# Patient Record
Sex: FEMALE | Race: BLACK OR AFRICAN AMERICAN | NOT HISPANIC OR LATINO | URBAN - METROPOLITAN AREA
[De-identification: names, ages, dates, MRNs, and addresses within clinical notes are randomized per-mention and may not be internally consistent; named-entity substitution may affect disease eponyms.]

---

## 2021-04-03 ENCOUNTER — EMERGENCY (EMERGENCY)
Facility: HOSPITAL | Age: 26
LOS: 1 days | Discharge: HOME | End: 2021-04-03
Attending: EMERGENCY MEDICINE | Admitting: EMERGENCY MEDICINE
Payer: COMMERCIAL

## 2021-04-03 VITALS
TEMPERATURE: 98 F | HEART RATE: 81 BPM | SYSTOLIC BLOOD PRESSURE: 131 MMHG | RESPIRATION RATE: 16 BRPM | DIASTOLIC BLOOD PRESSURE: 74 MMHG | OXYGEN SATURATION: 100 %

## 2021-04-03 VITALS
HEART RATE: 87 BPM | DIASTOLIC BLOOD PRESSURE: 73 MMHG | RESPIRATION RATE: 16 BRPM | SYSTOLIC BLOOD PRESSURE: 125 MMHG | TEMPERATURE: 97 F | OXYGEN SATURATION: 97 %

## 2021-04-03 LAB
ALBUMIN SERPL ELPH-MCNC: 4.1 G/DL — SIGNIFICANT CHANGE UP (ref 3.3–5)
ALP SERPL-CCNC: 54 U/L — SIGNIFICANT CHANGE UP (ref 40–120)
ALT FLD-CCNC: 9 U/L — SIGNIFICANT CHANGE UP (ref 4–33)
ANION GAP SERPL CALC-SCNC: 13 MMOL/L — SIGNIFICANT CHANGE UP (ref 7–14)
APPEARANCE UR: ABNORMAL
APTT BLD: 28.2 SEC — SIGNIFICANT CHANGE UP (ref 27–36.3)
AST SERPL-CCNC: 23 U/L — SIGNIFICANT CHANGE UP (ref 4–32)
B PERT DNA SPEC QL NAA+PROBE: SIGNIFICANT CHANGE UP
BACTERIA # UR AUTO: ABNORMAL
BASOPHILS # BLD AUTO: 0.04 K/UL — SIGNIFICANT CHANGE UP (ref 0–0.2)
BASOPHILS NFR BLD AUTO: 0.4 % — SIGNIFICANT CHANGE UP (ref 0–2)
BILIRUB SERPL-MCNC: 0.3 MG/DL — SIGNIFICANT CHANGE UP (ref 0.2–1.2)
BILIRUB UR-MCNC: NEGATIVE — SIGNIFICANT CHANGE UP
BLD GP AB SCN SERPL QL: NEGATIVE — SIGNIFICANT CHANGE UP
BLOOD GAS VENOUS COMPREHENSIVE RESULT: SIGNIFICANT CHANGE UP
BUN SERPL-MCNC: 5 MG/DL — LOW (ref 7–23)
C PNEUM DNA SPEC QL NAA+PROBE: SIGNIFICANT CHANGE UP
CALCIUM SERPL-MCNC: 9.4 MG/DL — SIGNIFICANT CHANGE UP (ref 8.4–10.5)
CHLORIDE SERPL-SCNC: 97 MMOL/L — LOW (ref 98–107)
CO2 SERPL-SCNC: 22 MMOL/L — SIGNIFICANT CHANGE UP (ref 22–31)
COLOR SPEC: YELLOW — SIGNIFICANT CHANGE UP
CREAT SERPL-MCNC: 0.62 MG/DL — SIGNIFICANT CHANGE UP (ref 0.5–1.3)
DIFF PNL FLD: NEGATIVE — SIGNIFICANT CHANGE UP
EOSINOPHIL # BLD AUTO: 0.06 K/UL — SIGNIFICANT CHANGE UP (ref 0–0.5)
EOSINOPHIL NFR BLD AUTO: 0.6 % — SIGNIFICANT CHANGE UP (ref 0–6)
EPI CELLS # UR: 9 /HPF — HIGH (ref 0–5)
FLUAV SUBTYP SPEC NAA+PROBE: SIGNIFICANT CHANGE UP
FLUBV RNA SPEC QL NAA+PROBE: SIGNIFICANT CHANGE UP
GLUCOSE SERPL-MCNC: 88 MG/DL — SIGNIFICANT CHANGE UP (ref 70–99)
GLUCOSE UR QL: NEGATIVE — SIGNIFICANT CHANGE UP
HADV DNA SPEC QL NAA+PROBE: SIGNIFICANT CHANGE UP
HCG SERPL-ACNC: SIGNIFICANT CHANGE UP MIU/ML
HCOV 229E RNA SPEC QL NAA+PROBE: SIGNIFICANT CHANGE UP
HCOV HKU1 RNA SPEC QL NAA+PROBE: SIGNIFICANT CHANGE UP
HCOV NL63 RNA SPEC QL NAA+PROBE: SIGNIFICANT CHANGE UP
HCOV OC43 RNA SPEC QL NAA+PROBE: SIGNIFICANT CHANGE UP
HCT VFR BLD CALC: 38.6 % — SIGNIFICANT CHANGE UP (ref 34.5–45)
HGB BLD-MCNC: 12.5 G/DL — SIGNIFICANT CHANGE UP (ref 11.5–15.5)
HMPV RNA SPEC QL NAA+PROBE: SIGNIFICANT CHANGE UP
HPIV1 RNA SPEC QL NAA+PROBE: SIGNIFICANT CHANGE UP
HPIV2 RNA SPEC QL NAA+PROBE: SIGNIFICANT CHANGE UP
HPIV3 RNA SPEC QL NAA+PROBE: SIGNIFICANT CHANGE UP
HPIV4 RNA SPEC QL NAA+PROBE: SIGNIFICANT CHANGE UP
HYALINE CASTS # UR AUTO: 14 /LPF — HIGH (ref 0–7)
IANC: 6.17 K/UL — SIGNIFICANT CHANGE UP (ref 1.5–8.5)
IMM GRANULOCYTES NFR BLD AUTO: 0.4 % — SIGNIFICANT CHANGE UP (ref 0–1.5)
INR BLD: 1.17 RATIO — HIGH (ref 0.88–1.16)
KETONES UR-MCNC: ABNORMAL
LEUKOCYTE ESTERASE UR-ACNC: ABNORMAL
LYMPHOCYTES # BLD AUTO: 2.12 K/UL — SIGNIFICANT CHANGE UP (ref 1–3.3)
LYMPHOCYTES # BLD AUTO: 22.9 % — SIGNIFICANT CHANGE UP (ref 13–44)
MCHC RBC-ENTMCNC: 25.5 PG — LOW (ref 27–34)
MCHC RBC-ENTMCNC: 32.4 GM/DL — SIGNIFICANT CHANGE UP (ref 32–36)
MCV RBC AUTO: 78.8 FL — LOW (ref 80–100)
MONOCYTES # BLD AUTO: 0.82 K/UL — SIGNIFICANT CHANGE UP (ref 0–0.9)
MONOCYTES NFR BLD AUTO: 8.9 % — SIGNIFICANT CHANGE UP (ref 2–14)
NEUTROPHILS # BLD AUTO: 6.17 K/UL — SIGNIFICANT CHANGE UP (ref 1.8–7.4)
NEUTROPHILS NFR BLD AUTO: 66.8 % — SIGNIFICANT CHANGE UP (ref 43–77)
NITRITE UR-MCNC: POSITIVE
NRBC # BLD: 0 /100 WBCS — SIGNIFICANT CHANGE UP
NRBC # FLD: 0 K/UL — SIGNIFICANT CHANGE UP
PH UR: 6.5 — SIGNIFICANT CHANGE UP (ref 5–8)
PLATELET # BLD AUTO: 338 K/UL — SIGNIFICANT CHANGE UP (ref 150–400)
POTASSIUM SERPL-MCNC: 4.6 MMOL/L — SIGNIFICANT CHANGE UP (ref 3.5–5.3)
POTASSIUM SERPL-SCNC: 4.6 MMOL/L — SIGNIFICANT CHANGE UP (ref 3.5–5.3)
PROT SERPL-MCNC: 7.8 G/DL — SIGNIFICANT CHANGE UP (ref 6–8.3)
PROT UR-MCNC: ABNORMAL
PROTHROM AB SERPL-ACNC: 13.4 SEC — SIGNIFICANT CHANGE UP (ref 10.6–13.6)
RAPID RVP RESULT: SIGNIFICANT CHANGE UP
RBC # BLD: 4.9 M/UL — SIGNIFICANT CHANGE UP (ref 3.8–5.2)
RBC # FLD: 16.2 % — HIGH (ref 10.3–14.5)
RBC CASTS # UR COMP ASSIST: 3 /HPF — SIGNIFICANT CHANGE UP (ref 0–4)
RH IG SCN BLD-IMP: POSITIVE — SIGNIFICANT CHANGE UP
RH IG SCN BLD-IMP: POSITIVE — SIGNIFICANT CHANGE UP
RSV RNA SPEC QL NAA+PROBE: SIGNIFICANT CHANGE UP
RV+EV RNA SPEC QL NAA+PROBE: SIGNIFICANT CHANGE UP
SARS-COV-2 RNA SPEC QL NAA+PROBE: SIGNIFICANT CHANGE UP
SODIUM SERPL-SCNC: 132 MMOL/L — LOW (ref 135–145)
SP GR SPEC: 1.02 — SIGNIFICANT CHANGE UP (ref 1.01–1.02)
UROBILINOGEN FLD QL: SIGNIFICANT CHANGE UP
WBC # BLD: 9.25 K/UL — SIGNIFICANT CHANGE UP (ref 3.8–10.5)
WBC # FLD AUTO: 9.25 K/UL — SIGNIFICANT CHANGE UP (ref 3.8–10.5)
WBC UR QL: 54 /HPF — HIGH (ref 0–5)

## 2021-04-03 PROCEDURE — 76830 TRANSVAGINAL US NON-OB: CPT | Mod: 26

## 2021-04-03 PROCEDURE — 99285 EMERGENCY DEPT VISIT HI MDM: CPT

## 2021-04-03 RX ORDER — CEFTRIAXONE 500 MG/1
1000 INJECTION, POWDER, FOR SOLUTION INTRAMUSCULAR; INTRAVENOUS ONCE
Refills: 0 | Status: COMPLETED | OUTPATIENT
Start: 2021-04-03 | End: 2021-04-03

## 2021-04-03 RX ORDER — MORPHINE SULFATE 50 MG/1
4 CAPSULE, EXTENDED RELEASE ORAL ONCE
Refills: 0 | Status: DISCONTINUED | OUTPATIENT
Start: 2021-04-03 | End: 2021-04-03

## 2021-04-03 RX ORDER — ACETAMINOPHEN 500 MG
975 TABLET ORAL ONCE
Refills: 0 | Status: COMPLETED | OUTPATIENT
Start: 2021-04-03 | End: 2021-04-03

## 2021-04-03 RX ORDER — ONDANSETRON 8 MG/1
4 TABLET, FILM COATED ORAL ONCE
Refills: 0 | Status: COMPLETED | OUTPATIENT
Start: 2021-04-03 | End: 2021-04-03

## 2021-04-03 RX ORDER — CEPHALEXIN 500 MG
1 CAPSULE ORAL
Qty: 40 | Refills: 0
Start: 2021-04-03 | End: 2021-04-12

## 2021-04-03 RX ORDER — SODIUM CHLORIDE 9 MG/ML
1000 INJECTION, SOLUTION INTRAVENOUS ONCE
Refills: 0 | Status: COMPLETED | OUTPATIENT
Start: 2021-04-03 | End: 2021-04-03

## 2021-04-03 RX ORDER — MULTIVIT WITH MIN/MFOLATE/K2 340-15/3 G
1 POWDER (GRAM) ORAL ONCE
Refills: 0 | Status: COMPLETED | OUTPATIENT
Start: 2021-04-03 | End: 2021-04-03

## 2021-04-03 RX ORDER — ACETAMINOPHEN 500 MG
650 TABLET ORAL ONCE
Refills: 0 | Status: DISCONTINUED | OUTPATIENT
Start: 2021-04-03 | End: 2021-04-03

## 2021-04-03 RX ADMIN — SODIUM CHLORIDE 1000 MILLILITER(S): 9 INJECTION, SOLUTION INTRAVENOUS at 07:20

## 2021-04-03 RX ADMIN — CEFTRIAXONE 100 MILLIGRAM(S): 500 INJECTION, POWDER, FOR SOLUTION INTRAMUSCULAR; INTRAVENOUS at 08:49

## 2021-04-03 RX ADMIN — Medication 1 BOTTLE: at 13:24

## 2021-04-03 RX ADMIN — Medication 975 MILLIGRAM(S): at 07:20

## 2021-04-03 RX ADMIN — Medication 1 ENEMA: at 13:24

## 2021-04-03 RX ADMIN — ONDANSETRON 4 MILLIGRAM(S): 8 TABLET, FILM COATED ORAL at 06:57

## 2021-04-03 RX ADMIN — MORPHINE SULFATE 4 MILLIGRAM(S): 50 CAPSULE, EXTENDED RELEASE ORAL at 08:10

## 2021-04-03 RX ADMIN — MORPHINE SULFATE 4 MILLIGRAM(S): 50 CAPSULE, EXTENDED RELEASE ORAL at 07:42

## 2021-04-03 NOTE — ED PROVIDER NOTE - NSFOLLOWUPINSTRUCTIONS_ED_ALL_ED_FT
YOU WERE SEEN IN THE ED FOR: abdominal pain    YOU WERE PRESCRIBED: keflex  FOLLOW THE INSTRUCTIONS ON THE LABEL/CONTAINER    FOR PAIN/FEVER, YOU MAY TAKE TYLENOL (acetaminophen). FOLLOW THE INSTRUCTIONS ON THE LABEL/CONTAINER.    continue to take magnesium citrate, colace, and enemas as needed.  -you may try a sitz bath (clean a tub or basin well, put warm water with or without some salt, and sit in it to help shrink your hemorrhoids  -please see attached on hemorrhoids    PLEASE FOLLOW UP WITH COLORECTAL SURGEON WITHIN 1 WEEK. INFORMATION TO CALL AND MAKE AN APPOINTMENT IS PROVIDED.    PLEASE FOLLOW UP WITH YOUR PRIVATE PHYSICIAN WITHIN THE NEXT 48 HOURS. BRING COPIES OF YOUR RESULTS.    RETURN TO THE EMERGENCY DEPARTMENT IF YOU EXPERIENCE ANY NEW/CONCERNING/WORSENING SYMPTOMS.

## 2021-04-03 NOTE — ED PROVIDER NOTE - PROGRESS NOTE DETAILS
Alejandro Choi D.O., PGY2 (Resident)  Patient signed out to me. Pending TVUS.  (1 elective surgical , 1 miscarriage). No vaginal bleeding. + hemorrhoids and rectal bleeding. Has been trying colace and hydrocortisone cream. Moderate ttp LLQ. Pain improved after pain meds. Pending TVUS, UA. Rpt CBC 2/2 clotted 1st sample. Alejandro Choi D.O., PGY2 (Resident)  Multiple attempts to have patient have a BM with fleet enema, mag citrate, manual disimpaction w/o success. Patient unable to tolerate manual disimpaction due to multiple hemorrhoids. After shared decision making, patient endorsing she would prefer to go home and monitor sxs -> return if worsening. This sounds reasonable. Return precautions as well as colorectal f/u given.

## 2021-04-03 NOTE — ED PROVIDER NOTE - OBJECTIVE STATEMENT
Geronimo TIPTON MD PGY3: 25 F 9w by LMP (no US done yet) here with acute onset LLQ abdominal pain radiating to entire abdomen since yesterday morning. Patient noted that the pain is sharp, intermittent and radiates throughout the whole abdomen. No nausea, vomiting and is passing flatus. No vaginal bleeding. No hx of abdominal surgeries.

## 2021-04-03 NOTE — ED PROVIDER NOTE - ATTENDING CONTRIBUTION TO CARE
MD De Oliveira:  I performed a face to face bedside interview with patient regarding history of present illness, review of symptoms and past medical history. I completed an independent physical exam(documented below).  I have discussed patient's plan of care with resident.   I agree with note as stated above, having amended the EMR as needed to reflect my findings. I have discussed the assessment and plan of care.  This includes during the time I functioned as the attending physician for this patient.  PE:  Gen: Alert, moderate distress  Head: NC, AT,  EOMI, normal lids/conjunctiva  ENT:  normal hearing, patent oropharynx without erythema/exudate  Neck: +supple, no tenderness/meningismus/JVD, +Trachea midline  Chest: no chest wall tenderness, equal chest rise  Pulm: Bilateral BS, normal resp effort, no wheeze/stridor/retractions  CV: RRR, no M/R/G, +dist pulses  Abd: +BS, soft, ND, +ttp left adnexal region>suprapubic>RLQ  Rectal: deferred  Mskel: no edema/erythema/cyanosis  Skin: no rash  Neuro: AAOx3  MDM:  24yo F pregnant F, [id number] @ approx 9wks gestation (not yet confirmed by US), p/w lower abd pain. On exam, exquisitely ttp L adnexal region, concern for ectopic pregnancy. Labs, us, pain mngmt, likely OBGYN consult given severity of pain.

## 2021-04-03 NOTE — ED ADULT NURSE REASSESSMENT NOTE - NS ED NURSE REASSESS COMMENT FT1
Pt in NAD, states relief of abdominal pain at this present time, denies n/v/d, afebrile, breathing even and unlabored, vs taken and documented, lab collected and drawn, ivl is clear and patent.

## 2021-04-03 NOTE — ED PROVIDER NOTE - PHYSICAL EXAMINATION
Geronimo TIPTON MD PGY3:   PHYSICAL EXAM:    GENERAL: NAD, well-developed  HEENT:  Atraumatic, Normocephalic  CHEST/LUNG: Chest rise equal bilaterally. CTAB.   HEART: Regular rate and rhythm. No murmurs or rubs.   ABDOMEN: L sided abd TTP, lower. L adnexal TTP.   EXTREMITIES:  2+ Peripheral Pulses.  PSYCH: A&Ox3  SKIN: No obvious rashes or lesions

## 2021-04-03 NOTE — ED PROVIDER NOTE - CLINICAL SUMMARY MEDICAL DECISION MAKING FREE TEXT BOX
Geronimo TIPTON MD PGY3: 25 F 9w by LMP here with L sided abdominal pain, lower and L adnexal TTP in the setting of unknown location of pregnancy. Will obtain preop labs and TVUS in anticipation of possible ectopic. UA to eval UTI/pyelo. Pain control. Will reassess.

## 2021-04-03 NOTE — ED PROCEDURE NOTE - US DIAGNOSIS
intrauterine hyperechoic structure visualized, but no appreciable FHR/Limited/Inconclusive no appreciable FHR/IUP/Limited/Inconclusive

## 2021-04-03 NOTE — ED PROVIDER NOTE - NSFOLLOWUPCLINICS_GEN_ALL_ED_FT
[facility]  General Surgery  [address] [place] [address] - [address] [place] [address]  Phone: [phone]  Fax:   Follow Up Time: 7-10 Days

## 2021-04-03 NOTE — ED ADULT TRIAGE NOTE - CHIEF COMPLAINT QUOTE
Pt st" I am having very bad rt sided abd pain and rectal bleeding I have hemerroids because I have very bad constipation. I have been constipated for a week now.....Mom gave me a ducolax yesterday and had a Bm last night but I still feel constipated." I am also apprx 9 weeks pregnant have not seen OBGYN yet." Pt appears very uncomfortable

## 2021-04-03 NOTE — ED PROVIDER NOTE - PATIENT PORTAL LINK FT
You can access the FollowMyHealth Patient Portal offered by [facility] by registering at the following website: [email or web address]. By joining [employer]’s FollowMyHealth portal, you will also be able to view your health information using other applications (apps) compatible with our system.

## 2021-04-03 NOTE — ED PROVIDER NOTE - CARE PLAN
Principal Discharge DX:	Adnexal pain   Principal Discharge DX:	UTI (urinary tract infection)  Secondary Diagnosis:	Hemorrhoids

## 2021-04-03 NOTE — ED ADULT NURSE NOTE - OBJECTIVE STATEMENT
Received pt rm 8, A&Ox4, ambulatory. 26 y/o female hx of asthma complaining of lower quadrant abdominal pain that radiates to lower back. Pt states she is 9 weeks pregnant. pt also endorses rectal pain from hemorrhoids. Abdomen is soft and tender in the lower left quadrant mainly but also endorses generalized belly pain; sharp and cramping in nature. Denies vaginal bleeding, chest pain, fever/chills, weakness, nausea/vomiting, headache, urinary frequency. Iv placed, labs drawn, meds given as ordered. pt awaiting US at this time.

## 2021-08-17 ENCOUNTER — EMERGENCY (EMERGENCY)
Facility: HOSPITAL | Age: 26
LOS: 1 days | Discharge: HOME | End: 2021-08-17
Attending: STUDENT IN AN ORGANIZED HEALTH CARE EDUCATION/TRAINING PROGRAM | Admitting: STUDENT IN AN ORGANIZED HEALTH CARE EDUCATION/TRAINING PROGRAM
Payer: COMMERCIAL

## 2021-08-17 VITALS
SYSTOLIC BLOOD PRESSURE: 123 MMHG | TEMPERATURE: 99 F | DIASTOLIC BLOOD PRESSURE: 87 MMHG | HEART RATE: 69 BPM | OXYGEN SATURATION: 100 % | RESPIRATION RATE: 18 BRPM

## 2021-08-17 VITALS
OXYGEN SATURATION: 99 % | SYSTOLIC BLOOD PRESSURE: 122 MMHG | RESPIRATION RATE: 18 BRPM | DIASTOLIC BLOOD PRESSURE: 95 MMHG | TEMPERATURE: 98 F | HEART RATE: 78 BPM

## 2021-08-17 LAB
ALBUMIN SERPL ELPH-MCNC: 4.1 G/DL — SIGNIFICANT CHANGE UP (ref 3.3–5)
ALP SERPL-CCNC: 62 U/L — SIGNIFICANT CHANGE UP (ref 40–120)
ALT FLD-CCNC: 17 U/L — SIGNIFICANT CHANGE UP (ref 4–33)
ANION GAP SERPL CALC-SCNC: 12 MMOL/L — SIGNIFICANT CHANGE UP (ref 7–14)
APPEARANCE UR: CLEAR — SIGNIFICANT CHANGE UP
AST SERPL-CCNC: 23 U/L — SIGNIFICANT CHANGE UP (ref 4–32)
BASOPHILS # BLD AUTO: 0.08 K/UL — SIGNIFICANT CHANGE UP (ref 0–0.2)
BASOPHILS NFR BLD AUTO: 1 % — SIGNIFICANT CHANGE UP (ref 0–2)
BILIRUB SERPL-MCNC: 0.3 MG/DL — SIGNIFICANT CHANGE UP (ref 0.2–1.2)
BILIRUB UR-MCNC: NEGATIVE — SIGNIFICANT CHANGE UP
BUN SERPL-MCNC: 10 MG/DL — SIGNIFICANT CHANGE UP (ref 7–23)
CALCIUM SERPL-MCNC: 9.7 MG/DL — SIGNIFICANT CHANGE UP (ref 8.4–10.5)
CHLORIDE SERPL-SCNC: 103 MMOL/L — SIGNIFICANT CHANGE UP (ref 98–107)
CO2 SERPL-SCNC: 23 MMOL/L — SIGNIFICANT CHANGE UP (ref 22–31)
COLOR SPEC: YELLOW — SIGNIFICANT CHANGE UP
CREAT SERPL-MCNC: 0.8 MG/DL — SIGNIFICANT CHANGE UP (ref 0.5–1.3)
DIFF PNL FLD: NEGATIVE — SIGNIFICANT CHANGE UP
EOSINOPHIL # BLD AUTO: 0.15 K/UL — SIGNIFICANT CHANGE UP (ref 0–0.5)
EOSINOPHIL NFR BLD AUTO: 1.9 % — SIGNIFICANT CHANGE UP (ref 0–6)
GLUCOSE SERPL-MCNC: 84 MG/DL — SIGNIFICANT CHANGE UP (ref 70–99)
GLUCOSE UR QL: NEGATIVE — SIGNIFICANT CHANGE UP
HCT VFR BLD CALC: 41 % — SIGNIFICANT CHANGE UP (ref 34.5–45)
HGB BLD-MCNC: 12.8 G/DL — SIGNIFICANT CHANGE UP (ref 11.5–15.5)
HIV 1+2 AB+HIV1 P24 AG SERPL QL IA: SIGNIFICANT CHANGE UP
IANC: 3.98 K/UL — SIGNIFICANT CHANGE UP (ref 1.5–8.5)
IMM GRANULOCYTES NFR BLD AUTO: 0.3 % — SIGNIFICANT CHANGE UP (ref 0–1.5)
KETONES UR-MCNC: NEGATIVE — SIGNIFICANT CHANGE UP
LEUKOCYTE ESTERASE UR-ACNC: NEGATIVE — SIGNIFICANT CHANGE UP
LIDOCAIN IGE QN: 19 U/L — SIGNIFICANT CHANGE UP (ref 7–60)
LYMPHOCYTES # BLD AUTO: 2.95 K/UL — SIGNIFICANT CHANGE UP (ref 1–3.3)
LYMPHOCYTES # BLD AUTO: 37.4 % — SIGNIFICANT CHANGE UP (ref 13–44)
MCHC RBC-ENTMCNC: 25 PG — LOW (ref 27–34)
MCHC RBC-ENTMCNC: 31.2 GM/DL — LOW (ref 32–36)
MCV RBC AUTO: 80.1 FL — SIGNIFICANT CHANGE UP (ref 80–100)
MONOCYTES # BLD AUTO: 0.7 K/UL — SIGNIFICANT CHANGE UP (ref 0–0.9)
MONOCYTES NFR BLD AUTO: 8.9 % — SIGNIFICANT CHANGE UP (ref 2–14)
NEUTROPHILS # BLD AUTO: 3.98 K/UL — SIGNIFICANT CHANGE UP (ref 1.8–7.4)
NEUTROPHILS NFR BLD AUTO: 50.5 % — SIGNIFICANT CHANGE UP (ref 43–77)
NITRITE UR-MCNC: NEGATIVE — SIGNIFICANT CHANGE UP
NRBC # BLD: 0 /100 WBCS — SIGNIFICANT CHANGE UP
NRBC # FLD: 0 K/UL — SIGNIFICANT CHANGE UP
PH UR: 6 — SIGNIFICANT CHANGE UP (ref 5–8)
PLATELET # BLD AUTO: 395 K/UL — SIGNIFICANT CHANGE UP (ref 150–400)
POTASSIUM SERPL-MCNC: 4 MMOL/L — SIGNIFICANT CHANGE UP (ref 3.5–5.3)
POTASSIUM SERPL-SCNC: 4 MMOL/L — SIGNIFICANT CHANGE UP (ref 3.5–5.3)
PROT SERPL-MCNC: 7.7 G/DL — SIGNIFICANT CHANGE UP (ref 6–8.3)
PROT UR-MCNC: ABNORMAL
RBC # BLD: 5.12 M/UL — SIGNIFICANT CHANGE UP (ref 3.8–5.2)
RBC # FLD: 16.1 % — HIGH (ref 10.3–14.5)
SODIUM SERPL-SCNC: 138 MMOL/L — SIGNIFICANT CHANGE UP (ref 135–145)
SP GR SPEC: 1.03 — HIGH (ref 1.01–1.02)
T PALLIDUM AB TITR SER: NEGATIVE — SIGNIFICANT CHANGE UP
UROBILINOGEN FLD QL: SIGNIFICANT CHANGE UP
WBC # BLD: 7.88 K/UL — SIGNIFICANT CHANGE UP (ref 3.8–10.5)
WBC # FLD AUTO: 7.88 K/UL — SIGNIFICANT CHANGE UP (ref 3.8–10.5)

## 2021-08-17 PROCEDURE — 93010 ELECTROCARDIOGRAM REPORT: CPT

## 2021-08-17 PROCEDURE — 76830 TRANSVAGINAL US NON-OB: CPT | Mod: 26

## 2021-08-17 PROCEDURE — 99285 EMERGENCY DEPT VISIT HI MDM: CPT | Mod: 25

## 2021-08-17 RX ORDER — FAMOTIDINE 10 MG/ML
20 INJECTION INTRAVENOUS ONCE
Refills: 0 | Status: COMPLETED | OUTPATIENT
Start: 2021-08-17 | End: 2021-08-17

## 2021-08-17 RX ORDER — ACETAMINOPHEN 500 MG
975 TABLET ORAL ONCE
Refills: 0 | Status: COMPLETED | OUTPATIENT
Start: 2021-08-17 | End: 2021-08-17

## 2021-08-17 RX ADMIN — FAMOTIDINE 20 MILLIGRAM(S): 10 INJECTION INTRAVENOUS at 10:59

## 2021-08-17 RX ADMIN — Medication 30 MILLILITER(S): at 10:59

## 2021-08-17 RX ADMIN — Medication 975 MILLIGRAM(S): at 11:00

## 2021-08-17 NOTE — ED ADULT NURSE NOTE - CHIEF COMPLAINT QUOTE
Patient has c/o pain from her hemorrhoids and it seems that the abdominal pain, vaginal and back pain that she is having might be related.

## 2021-08-17 NOTE — ED ADULT NURSE NOTE - OBJECTIVE STATEMENT
pt received in intake exam room 7. alert and oriented x4, ambulates. complaining of abdominal, back and vaginal pain. states she has experienced nausea and constipation. denies fevers, chills, diarrhea, dizziness. lab sent. 20 gauge IV placed to right arm.

## 2021-08-17 NOTE — ED PROVIDER NOTE - PATIENT PORTAL LINK FT
You can access the FollowMyHealth Patient Portal offered by [facility] by registering at the following website: [email or web address]. By joining [employer]’s FollowMyHealth portal, you will also be able to view your health information using other applications (apps) compatible with our system.

## 2021-08-17 NOTE — ED PROVIDER NOTE - NSFOLLOWUPINSTRUCTIONS_ED_ALL_ED_FT
Take motrin 600mg (3 advil) every 8 hours with food or tylenol 650mg every 6-8 hours as needed for pain.  Use anusol suppositories 2x/day as needed for hemorrhoidal pain.  Continue taking stool softeners.  Sitz baths 20 min at a time 4x/day.  Drink plenty of fluids.  Follow up with your GI and gyn doctor within 1-2 weeks.  Return to ED for any worsening pain, fever, rectal bleeding, chest pain or shortness of breath.      Hemorrhoids    WHAT YOU NEED TO KNOW:    Hemorrhoids are swollen blood vessels inside your rectum (internal hemorrhoids) or on your anus (external hemorrhoids). Sometimes a hemorrhoid may prolapse. This means it extends out of your anus.    DISCHARGE INSTRUCTIONS:    Return to the emergency department if:   •You have severe pain in your rectum or around your anus.      •You have severe pain in your abdomen and you are vomiting.       •You have bleeding from your anus that soaks through your underwear.       Contact your healthcare provider if:   •You have frequent and painful bowel movements.      •Your hemorrhoid looks or feels more swollen than usual.       •You do not have a bowel movement for 2 days or more.       •You see or feel tissue coming through your anus.       •You have questions or concerns about your condition or care.      Medicines: You may need any of the following:   •A pad, cream, or ointment can help decrease pain, swelling, and itching.       •Stool softeners help treat or prevent constipation.       •NSAIDs, such as ibuprofen, help decrease swelling, pain, and fever. NSAIDs can cause stomach bleeding or kidney problems in certain people. If you take blood thinner medicine, always ask your healthcare provider if NSAIDs are safe for you. Always read the medicine label and follow directions.      •Take your medicine as directed. Contact your healthcare provider if you think your medicine is not helping or if you have side effects. Tell him or her if you are allergic to any medicine. Keep a list of the medicines, vitamins, and herbs you take. Include the amounts, and when and why you take them. Bring the list or the pill bottles to follow-up visits. Carry your medicine list with you in case of an emergency.      Manage your symptoms:   •Apply ice on your anus for 15 to 20 minutes every hour or as directed. Use an ice pack, or put crushed ice in a plastic bag. Cover it with a towel before you apply it to your anus. Ice helps prevent tissue damage and decreases swelling and pain.      •Take a sitz bath. Fill a bathtub with 4 to 6 inches of warm water. You may also use a sitz bath pan that fits inside a toilet bowl. Sit in the sitz bath for 15 minutes. Do this 3 times a day, and after each bowel movement. The warm water can help decrease pain and swelling.       •Keep your anal area clean. Gently wash the area with warm water daily. Soap may irritate the area. After a bowel movement, wipe with moist towelettes or wet toilet paper. Dry toilet paper can irritate the area.       Prevent hemorrhoids:   •Do not strain to have a bowel movement. Do not sit on the toilet too long. These actions can increase pressure on the tissues in your rectum and anus.       •Drink plenty of liquids. Liquids can help prevent constipation. Ask how much liquid to drink each day and which liquids are best for you.       •Eat a variety of high-fiber foods. Examples include fruits, vegetables, and whole grains. Ask your healthcare provider how much fiber you need each day. You may need to take a fiber supplement.              •Exercise as directed. Exercise, such as walking, may make it easier to have a bowel movement. Ask your healthcare provider to help you create an exercise plan.       •Do not have anal sex. Anal sex can weaken the skin around your rectum and anus.       •Avoid heavy lifting. This can cause straining and increase your risk for another hemorrhoid.

## 2021-08-17 NOTE — ED PROVIDER NOTE - CLINICAL SUMMARY MEDICAL DECISION MAKING FREE TEXT BOX
24 yo female c pmhx obesity, presents to ED c/o hemorrhoid pain and lower abdominal pain worsening over the past week.   hemorrhoids non-thrombosed, noted to have right adnexal tenderness.  r/o ovarian cyst vs torsion, will check labs, get TVUS, pain control and reassess

## 2021-08-17 NOTE — ED PROVIDER NOTE - OBJECTIVE STATEMENT
24 yo female c pmhx obesity, presents to ED c/o hemorrhoid pain and lower abdominal pain worsening over the past week.  Pt states has been constipated which has caused her hemorrhoids to be painful- states has seen a GI specialist for this in april and was given prednisone at that time which improved her pain.  Pt states was on her menses last week when she started to have lower abdominal pain however she finished her menses 3 days ago and the pain still persists.  Pt states pain radiates to her back and has been constant.  Pt endorses intermittent chest discomfort.  Currently pain 9/10.  Pt admits to marijuana use multiple times a day.  Pt denies any fever, vaginal discharge, urinary symptoms, SOB, n/v, recent travel.

## 2021-08-17 NOTE — ED PROVIDER NOTE - PROGRESS NOTE DETAILS
BENJAMÍN Yancey: Pt feeling better in NAD, US negative, labs wnl, will dc home with suppositories and advised to follow up with GI and GYN.

## 2021-08-17 NOTE — ED ADULT TRIAGE NOTE - CHIEF COMPLAINT QUOTE
Patient has c/o pain from her hemorrhoids and it seems that the abdominal pain and back pain that she is having might be related. Patient has c/o pain from her hemorrhoids and it seems that the abdominal pain, vaginal and back pain that she is having might be related.

## 2021-08-18 LAB
C TRACH RRNA SPEC QL NAA+PROBE: SIGNIFICANT CHANGE UP
CULTURE RESULTS: NO GROWTH — SIGNIFICANT CHANGE UP
N GONORRHOEA RRNA SPEC QL NAA+PROBE: SIGNIFICANT CHANGE UP
SPECIMEN SOURCE: SIGNIFICANT CHANGE UP
SPECIMEN SOURCE: SIGNIFICANT CHANGE UP

## 2021-08-21 RX ORDER — HYDROCORTISONE 1 %
1 OINTMENT (GRAM) TOPICAL
Qty: 12 | Refills: 0
Start: 2021-08-21

## 2022-01-31 NOTE — ED PROVIDER NOTE - CONDITION AT DISCHARGE:
[Dear  ___] : Dear  [unfilled], [Courtesy Letter:] : I had the pleasure of seeing your patient, [unfilled], in my office today. [Please see my note below.] : Please see my note below. [Referral Closing:] : Thank you very much for seeing this patient.  If you have any questions, please do not hesitate to contact me. [Sincerely,] : Sincerely, [DrMirza  ___] : Dr. FRAZIER [FreeTextEntry3] : Dr. Petty Steen Satisfactory

## 2022-08-20 ENCOUNTER — EMERGENCY (EMERGENCY)
Facility: HOSPITAL | Age: 27
LOS: 1 days | Discharge: HOME | End: 2022-08-20
Admitting: STUDENT IN AN ORGANIZED HEALTH CARE EDUCATION/TRAINING PROGRAM

## 2022-08-20 VITALS
HEART RATE: 86 BPM | DIASTOLIC BLOOD PRESSURE: 73 MMHG | RESPIRATION RATE: 16 BRPM | SYSTOLIC BLOOD PRESSURE: 124 MMHG | TEMPERATURE: 97 F | OXYGEN SATURATION: 100 %

## 2022-08-20 LAB
FLUAV AG NPH QL: SIGNIFICANT CHANGE UP
FLUBV AG NPH QL: SIGNIFICANT CHANGE UP
RSV RNA NPH QL NAA+NON-PROBE: SIGNIFICANT CHANGE UP
SARS-COV-2 RNA SPEC QL NAA+PROBE: SIGNIFICANT CHANGE UP

## 2022-08-20 PROCEDURE — 99284 EMERGENCY DEPT VISIT MOD MDM: CPT

## 2022-08-20 PROCEDURE — 71046 X-RAY EXAM CHEST 2 VIEWS: CPT | Mod: 26

## 2022-08-20 RX ORDER — ALBUTEROL 90 UG/1
2 AEROSOL, METERED ORAL
Qty: 1 | Refills: 0
Start: 2022-08-20 | End: 2022-09-02

## 2022-08-20 RX ORDER — ALBUTEROL 90 UG/1
2 AEROSOL, METERED ORAL EVERY 6 HOURS
Refills: 0 | Status: DISCONTINUED | OUTPATIENT
Start: 2022-08-20 | End: 2022-08-23

## 2022-08-20 RX ADMIN — ALBUTEROL 2 PUFF(S): 90 AEROSOL, METERED ORAL at 10:16

## 2022-08-20 NOTE — ED PROVIDER NOTE - CLINICAL SUMMARY MEDICAL DECISION MAKING FREE TEXT BOX
This is a 26 yr F, pmh asthma with c/o cough, difficulty of swallowing, chills, left ear pain, headache, for the last 4 days. Reports covid vaccination with booster. She was covid + in December 2021. Denies any headache, abd pain, n,v. Denies sick contact or recent travel.  xray- unremarkable  covid negative

## 2022-08-20 NOTE — ED PROVIDER NOTE - PATIENT PORTAL LINK FT
You can access the FollowMyHealth Patient Portal offered by [facility] by registering at the following website: [email or web address]. By joining [employer]’s FollowMyHealth portal, you will also be able to view your health information using other applications (apps) compatible with our system.

## 2022-08-20 NOTE — ED ADULT NURSE NOTE - NSIMPLEMENTINTERV_GEN_ALL_ED
Implemented All Universal Safety Interventions:  [place] to call system. Call bell, personal items and telephone within reach. Instruct patient to call for assistance. Room bathroom lighting operational. Non-slip footwear when patient is off stretcher. Physically safe environment: no spills, clutter or unnecessary equipment. Stretcher in lowest position, wheels locked, appropriate side rails in place.

## 2022-08-20 NOTE — ED PROVIDER NOTE - OBJECTIVE STATEMENT
This is a 26 yr F, pmh asthma with c/o cough, difficulty of swallowing, chills, left ear pain, headache, for the last 4 days This is a 26 yr F, pmh asthma with c/o cough, difficulty of swallowing, chills, left ear pain, headache, for the last 4 days. Reports covid vaccination with booster. She was covid + in December 2021. Denies any headache, abd pain, n,v. Denies sick contact or recent travel.

## 2025-05-24 ENCOUNTER — EMERGENCY (EMERGENCY)
Facility: HOSPITAL | Age: 30
LOS: 1 days | End: 2025-05-24
Attending: EMERGENCY MEDICINE | Admitting: EMERGENCY MEDICINE
Payer: COMMERCIAL

## 2025-05-24 VITALS
RESPIRATION RATE: 16 BRPM | WEIGHT: 210.1 LBS | OXYGEN SATURATION: 100 % | TEMPERATURE: 98 F | DIASTOLIC BLOOD PRESSURE: 82 MMHG | SYSTOLIC BLOOD PRESSURE: 134 MMHG | HEIGHT: 67 IN | HEART RATE: 65 BPM

## 2025-05-24 VITALS
OXYGEN SATURATION: 100 % | RESPIRATION RATE: 17 BRPM | HEART RATE: 69 BPM | SYSTOLIC BLOOD PRESSURE: 121 MMHG | DIASTOLIC BLOOD PRESSURE: 60 MMHG | TEMPERATURE: 98 F

## 2025-05-24 LAB
ALBUMIN SERPL ELPH-MCNC: 4 G/DL — SIGNIFICANT CHANGE UP (ref 3.3–5)
ALP SERPL-CCNC: 59 U/L — SIGNIFICANT CHANGE UP (ref 40–120)
ALT FLD-CCNC: 14 U/L — SIGNIFICANT CHANGE UP (ref 4–33)
ANION GAP SERPL CALC-SCNC: 14 MMOL/L — SIGNIFICANT CHANGE UP (ref 7–14)
APPEARANCE UR: CLEAR — SIGNIFICANT CHANGE UP
AST SERPL-CCNC: 14 U/L — SIGNIFICANT CHANGE UP (ref 4–32)
BACTERIA # UR AUTO: NEGATIVE /HPF — SIGNIFICANT CHANGE UP
BASOPHILS # BLD AUTO: 0.07 K/UL — SIGNIFICANT CHANGE UP (ref 0–0.2)
BASOPHILS NFR BLD AUTO: 0.7 % — SIGNIFICANT CHANGE UP (ref 0–2)
BILIRUB SERPL-MCNC: <0.2 MG/DL — SIGNIFICANT CHANGE UP (ref 0.2–1.2)
BILIRUB UR-MCNC: NEGATIVE — SIGNIFICANT CHANGE UP
BUN SERPL-MCNC: 10 MG/DL — SIGNIFICANT CHANGE UP (ref 7–23)
CALCIUM SERPL-MCNC: 9 MG/DL — SIGNIFICANT CHANGE UP (ref 8.4–10.5)
CAST: 0 /LPF — SIGNIFICANT CHANGE UP (ref 0–4)
CHLORIDE SERPL-SCNC: 101 MMOL/L — SIGNIFICANT CHANGE UP (ref 98–107)
CO2 SERPL-SCNC: 22 MMOL/L — SIGNIFICANT CHANGE UP (ref 22–31)
COLOR SPEC: ABNORMAL
CREAT SERPL-MCNC: 0.79 MG/DL — SIGNIFICANT CHANGE UP (ref 0.5–1.3)
DIFF PNL FLD: ABNORMAL
EGFR: 105 ML/MIN/1.73M2 — SIGNIFICANT CHANGE UP
EGFR: 105 ML/MIN/1.73M2 — SIGNIFICANT CHANGE UP
EOSINOPHIL # BLD AUTO: 0.07 K/UL — SIGNIFICANT CHANGE UP (ref 0–0.5)
EOSINOPHIL NFR BLD AUTO: 0.7 % — SIGNIFICANT CHANGE UP (ref 0–6)
GLUCOSE SERPL-MCNC: 82 MG/DL — SIGNIFICANT CHANGE UP (ref 70–99)
GLUCOSE UR QL: NEGATIVE MG/DL — SIGNIFICANT CHANGE UP
HCG SERPL-ACNC: <1 MIU/ML — SIGNIFICANT CHANGE UP
HCG UR QL: NEGATIVE — SIGNIFICANT CHANGE UP
HCT VFR BLD CALC: 42.3 % — SIGNIFICANT CHANGE UP (ref 34.5–45)
HGB BLD-MCNC: 13.5 G/DL — SIGNIFICANT CHANGE UP (ref 11.5–15.5)
HIV 1+2 AB+HIV1 P24 AG SERPL QL IA: SIGNIFICANT CHANGE UP
IANC: 6.33 K/UL — SIGNIFICANT CHANGE UP (ref 1.8–7.4)
IMM GRANULOCYTES NFR BLD AUTO: 1.1 % — HIGH (ref 0–0.9)
KETONES UR QL: NEGATIVE MG/DL — SIGNIFICANT CHANGE UP
LACTATE BLDV-MCNC: 1.1 MMOL/L — SIGNIFICANT CHANGE UP (ref 0.5–2)
LEUKOCYTE ESTERASE UR-ACNC: NEGATIVE — SIGNIFICANT CHANGE UP
LIDOCAIN IGE QN: 18 U/L — SIGNIFICANT CHANGE UP (ref 7–60)
LYMPHOCYTES # BLD AUTO: 2.83 K/UL — SIGNIFICANT CHANGE UP (ref 1–3.3)
LYMPHOCYTES # BLD AUTO: 27.7 % — SIGNIFICANT CHANGE UP (ref 13–44)
MCHC RBC-ENTMCNC: 26.8 PG — LOW (ref 27–34)
MCHC RBC-ENTMCNC: 31.9 G/DL — LOW (ref 32–36)
MCV RBC AUTO: 83.9 FL — SIGNIFICANT CHANGE UP (ref 80–100)
MONOCYTES # BLD AUTO: 0.79 K/UL — SIGNIFICANT CHANGE UP (ref 0–0.9)
MONOCYTES NFR BLD AUTO: 7.7 % — SIGNIFICANT CHANGE UP (ref 2–14)
NEUTROPHILS # BLD AUTO: 6.33 K/UL — SIGNIFICANT CHANGE UP (ref 1.8–7.4)
NEUTROPHILS NFR BLD AUTO: 62.1 % — SIGNIFICANT CHANGE UP (ref 43–77)
NITRITE UR-MCNC: NEGATIVE — SIGNIFICANT CHANGE UP
NRBC # BLD AUTO: 0 K/UL — SIGNIFICANT CHANGE UP (ref 0–0)
NRBC # FLD: 0 K/UL — SIGNIFICANT CHANGE UP (ref 0–0)
NRBC BLD AUTO-RTO: 0 /100 WBCS — SIGNIFICANT CHANGE UP (ref 0–0)
PH UR: 6.5 — SIGNIFICANT CHANGE UP (ref 5–8)
PLATELET # BLD AUTO: 388 K/UL — SIGNIFICANT CHANGE UP (ref 150–400)
POTASSIUM SERPL-MCNC: 3.8 MMOL/L — SIGNIFICANT CHANGE UP (ref 3.5–5.3)
POTASSIUM SERPL-SCNC: 3.8 MMOL/L — SIGNIFICANT CHANGE UP (ref 3.5–5.3)
PROT SERPL-MCNC: 7 G/DL — SIGNIFICANT CHANGE UP (ref 6–8.3)
PROT UR-MCNC: NEGATIVE MG/DL — SIGNIFICANT CHANGE UP
RBC # BLD: 5.04 M/UL — SIGNIFICANT CHANGE UP (ref 3.8–5.2)
RBC # FLD: 15.6 % — HIGH (ref 10.3–14.5)
RBC CASTS # UR COMP ASSIST: 0 /HPF — SIGNIFICANT CHANGE UP (ref 0–4)
SODIUM SERPL-SCNC: 137 MMOL/L — SIGNIFICANT CHANGE UP (ref 135–145)
SP GR SPEC: 1.01 — SIGNIFICANT CHANGE UP (ref 1–1.03)
SQUAMOUS # UR AUTO: 1 /HPF — SIGNIFICANT CHANGE UP (ref 0–5)
TROPONIN T, HIGH SENSITIVITY RESULT: <6 NG/L — SIGNIFICANT CHANGE UP
UROBILINOGEN FLD QL: 0.2 MG/DL — SIGNIFICANT CHANGE UP (ref 0.2–1)
WBC # BLD: 10.2 K/UL — SIGNIFICANT CHANGE UP (ref 3.8–10.5)
WBC # FLD AUTO: 10.2 K/UL — SIGNIFICANT CHANGE UP (ref 3.8–10.5)
WBC UR QL: 1 /HPF — SIGNIFICANT CHANGE UP (ref 0–5)

## 2025-05-24 PROCEDURE — 74177 CT ABD & PELVIS W/CONTRAST: CPT | Mod: 26

## 2025-05-24 PROCEDURE — 99285 EMERGENCY DEPT VISIT HI MDM: CPT

## 2025-05-24 PROCEDURE — 76830 TRANSVAGINAL US NON-OB: CPT | Mod: 26

## 2025-05-24 PROCEDURE — 93010 ELECTROCARDIOGRAM REPORT: CPT

## 2025-05-24 RX ORDER — KETOROLAC TROMETHAMINE 30 MG/ML
30 INJECTION, SOLUTION INTRAMUSCULAR; INTRAVENOUS ONCE
Refills: 0 | Status: DISCONTINUED | OUTPATIENT
Start: 2025-05-24 | End: 2025-05-24

## 2025-05-24 RX ORDER — ACETAMINOPHEN 500 MG/5ML
650 LIQUID (ML) ORAL ONCE
Refills: 0 | Status: COMPLETED | OUTPATIENT
Start: 2025-05-24 | End: 2025-05-24

## 2025-05-24 RX ADMIN — Medication 20 MILLIGRAM(S): at 11:03

## 2025-05-24 RX ADMIN — Medication 650 MILLIGRAM(S): at 11:32

## 2025-05-24 RX ADMIN — Medication 650 MILLIGRAM(S): at 11:02

## 2025-05-24 RX ADMIN — Medication 1000 MILLILITER(S): at 11:05

## 2025-05-24 RX ADMIN — KETOROLAC TROMETHAMINE 30 MILLIGRAM(S): 30 INJECTION, SOLUTION INTRAMUSCULAR; INTRAVENOUS at 12:53

## 2025-05-24 NOTE — ED PROVIDER NOTE - PHYSICAL EXAMINATION
Patient ambulatory in the emergency department.  No respiratory distress.  ANO x 3..  No jaundice.  Not pale talking in full clear sentences.  Abdomen is soft, nondistended, tender in the upper mid abdomen and lower suprapubic area.  Chaperone Trish, tech no CMT, no adnexal tenderness, moderate bleeding.  No leg swelling no calf tenderness bilaterally.

## 2025-05-24 NOTE — ED ADULT NURSE NOTE - OBJECTIVE STATEMENT
Received patient in Intake 12 c/o lower abdominal pain, nausea, right sided chest pain today. HX Asthma. Patient denies SOB, fever, headache. Patient is A&OX4, ambulatory, airway patent, breathing unlabored and even, radial pulses palpable. Labs obtained, 22G IV placed on left arm, medications given, IV fluid bolus infusing. Side rails up and safety maintained. Call bells within reach.

## 2025-05-24 NOTE — ED PROVIDER NOTE - OBJECTIVE STATEMENT
29-year-old male history of asthma presents with abdominal x 3 days located in the lower mid abdomen, has been constant, nonradiating, associated with nausea but no vomiting until this morning.  She vomited once "a very small amount" nonbloody.  No diarrhea no melena.  She is on her menstrual cycle.  States that since March she has had very irregular menses.  She has been bleeding longer than usual.  Normally her menstrual cycle last 5 days however last 2 months she has been bleeding for about 15 days.  Not dizzy not lightheaded no fever no chills.  Denies pregnancy.  She has had 5 prior pregnancies with 5 elective terminations.  Currently not on OCPs.  Does not have a GYN at this time.  Sexually active with 1 partner.  No history of STIs  States the last night she had severe abdominal discomfort to her lower abdomen and an episode of right-sided chest pain.  Which has resolved since.  No difficulty breathing no cough and no URI complaints.  Patient drinks alcohol socially.  Smokes weed every day, last month she had a trip to [place] where she used cocaine  Denies any past surgical history. 29-year-old Female history of asthma presents with abdominal x 3 days located in the lower mid abdomen, has been constant, nonradiating, associated with nausea but no vomiting until this morning.  She vomited once "a very small amount" nonbloody.  No diarrhea no melena.  She is on her menstrual cycle.  States that since March she has had very irregular menses.  She has been bleeding longer than usual.  Normally her menstrual cycle last 5 days however last 2 months she has been bleeding for about 15 days.  Not dizzy not lightheaded no fever no chills.  Denies pregnancy.  She has had 5 prior pregnancies with 5 elective terminations.  Currently not on OCPs.  Does not have a GYN at this time.  Sexually active with 1 partner.  No history of STIs  States the last night she had severe abdominal discomfort to her lower abdomen and an episode of right-sided chest pain.  Which has resolved since.  No difficulty breathing no cough and no URI complaints.  Patient drinks alcohol socially.  Smokes weed every day, last month she had a trip to [place] where she used cocaine  Denies any past surgical history.

## 2025-05-24 NOTE — ED PROVIDER NOTE - PATIENT PORTAL LINK FT
You can access the FollowMyHealth Patient Portal offered by [facility] by registering at the following website: [email or web address]. By joining [employer]’s FollowMyHealth portal, you will also be able to view your health information using other applications (apps) compatible with our system.

## 2025-05-24 NOTE — ED ADULT TRIAGE NOTE - CHIEF COMPLAINT QUOTE
C/O chest, mid-lower abdominal pain, nausea for 3 days. Last BM this morning. No fevers, chill. Hx asthma. C/O chest pain, mid-lower abdominal pain, nausea for 3 days. Last BM this morning. No fevers, chill. Hx asthma.

## 2025-05-24 NOTE — ED ADULT NURSE NOTE - CHIEF COMPLAINT QUOTE
C/O chest pain, mid-lower abdominal pain, nausea for 3 days. Last BM this morning. No fevers, chill. Hx asthma.

## 2025-05-24 NOTE — ED PROVIDER NOTE - NSFOLLOWUPINSTRUCTIONS_ED_ALL_ED_FT
Aftab Barrera saw Leelee today, 10 weeks out from ankle arthroscopy with open ATFL/CFL repairs.  She's full WB in shoes with a brace.  She'll be finishing up PT and will follow up 6 months from surgery for her next recheck.Barbara  Abdominal Pain    WHAT YOU NEED TO KNOW:    Abdominal pain can be dull, achy, or sharp. You may have pain in one area of your abdomen, or in your entire abdomen. Your pain may be caused by a condition such as constipation, food sensitivity or poisoning, infection, or a blockage. Abdominal pain can also be from a hernia, appendicitis, or an ulcer. Liver, gallbladder, or kidney conditions can also cause abdominal pain. The cause of your abdominal pain may be unknown.    DISCHARGE INSTRUCTIONS:    Return to the emergency department if:    You have new chest pain or shortness of breath.    You have pulsing pain in your upper abdomen or lower back that suddenly becomes constant.    Your pain is in the right lower abdominal area and worsens with movement.    You have a fever over 100.4°F (38°C) or shaking chills.    You are vomiting and cannot keep food or liquids down.    Your pain does not improve or gets worse over the next 8 to 12 hours.    You see blood in your vomit or bowel movements, or they look black and tarry.    Your skin or the whites of your eyes turn yellow.    You are a woman and have a large amount of vaginal bleeding that is not your monthly period.  Contact your healthcare provider if:    You have pain in your lower back.    You are a man and have pain in your testicles.    You have pain when you urinate.    You have questions or concerns about your condition or care.  Follow up with your healthcare provider within 24 hours or as directed: Write down your questions so you remember to ask them during your visits.    Medicines:    Medicines may be given to calm your stomach and prevent vomiting or to decrease pain. Ask how to take pain medicine safely.    Take your medicine as directed. Contact your healthcare provider if you think your medicine is not helping or if you have side effects. Tell him of her if you are allergic to any medicine. Keep a list of the medicines, vitamins, and herbs you take. Include the amounts, and when and why you take them. Bring the list or the pill bottles to follow-up visits. Carry your medicine list with you in case of an emergency.

## 2025-05-24 NOTE — ED PROVIDER NOTE - PROGRESS NOTE DETAILS
CBC, CMP unremarkable troponin negative not pregnant.  No lactate tested.  Ultrasound normal pelvic sonogram no ovarian torsion.  Patient appears comfortable in bed on her phone.  Pending CT to be performed.  Patient updated on results so far. CT abdomen pelvis within normal.  No bowel obstruction.  Appendix is normal.  No other findings. CBC stable.  No white count hemoglobin stable CMP unremarkable troponin negative hCG negative lactate negative CT abdomen pelvis unremarkable.  Appendix is normal no bowel obstruction.  UA moderate blood patient on her menstrual cycle.  Patient appears comfortable we will get Obvious the results and recommend her to follow-up with her PMD.  Strict return precautions given. CBC stable.  No white count hemoglobin stable CMP unremarkable troponin negative hCG negative lactate negative CT abdomen pelvis unremarkable.  Appendix is normal no bowel obstruction.  UA moderate blood patient on her menstrual cycle.  Patient appears comfortable we will get Obvious the results and recommend her to follow-up with her PMD.  Strict return precautions given. pt advised to follow up OBGYN

## 2025-05-24 NOTE — ED PROVIDER NOTE - HIV OFFER
Chief Complaint   Patient presents with    Annual Wellness Visit    Immunization/Injection     high dose flu shot        1. Have you been to the ER, urgent care clinic since your last visit? Hospitalized since your last visit? No    2. Have you seen or consulted any other health care providers outside of the [address] [place] [address] since your last visit? Include any pap smears or colon screening.  No Yes, get tested

## 2025-05-24 NOTE — ED PROVIDER NOTE - CLINICAL SUMMARY MEDICAL DECISION MAKING FREE TEXT BOX
Plan EKG, labs, chest x-ray, transvaginal ultrasound pain meds, IV fluids and reassess.  Plan discussed with patient.  EKG heart rate 67 normal sinus rhythm with no ST elevations or depressions.

## 2025-05-25 LAB
HCV AB S/CO SERPL IA: 0.15 S/CO — SIGNIFICANT CHANGE UP (ref 0–0.79)
HCV AB SERPL-IMP: SIGNIFICANT CHANGE UP

## 2025-06-02 PROBLEM — Z00.00 ENCOUNTER FOR PREVENTIVE HEALTH EXAMINATION: Status: ACTIVE | Noted: 2025-06-02

## 2025-06-03 ENCOUNTER — NON-APPOINTMENT (OUTPATIENT)
Age: 30
End: 2025-06-03

## 2025-06-03 ENCOUNTER — APPOINTMENT (OUTPATIENT)
Dept: OBGYN | Facility: CLINIC | Age: 30
End: 2025-06-03
Payer: COMMERCIAL

## 2025-06-03 VITALS
BODY MASS INDEX: 35.63 KG/M2 | SYSTOLIC BLOOD PRESSURE: 119 MMHG | HEIGHT: 67 IN | DIASTOLIC BLOOD PRESSURE: 80 MMHG | WEIGHT: 227 LBS

## 2025-06-03 DIAGNOSIS — N64.4 MASTODYNIA: ICD-10-CM

## 2025-06-03 DIAGNOSIS — Z11.3 ENCOUNTER FOR SCREENING FOR INFECTIONS WITH A PREDOMINANTLY SEXUAL MODE OF TRANSMISSION: ICD-10-CM

## 2025-06-03 DIAGNOSIS — N93.9 ABNORMAL UTERINE AND VAGINAL BLEEDING, UNSPECIFIED: ICD-10-CM

## 2025-06-03 DIAGNOSIS — Z01.419 ENCOUNTER FOR GYNECOLOGICAL EXAMINATION (GENERAL) (ROUTINE) W/OUT ABNORMAL FINDINGS: ICD-10-CM

## 2025-06-03 PROCEDURE — 36415 COLL VENOUS BLD VENIPUNCTURE: CPT

## 2025-06-03 PROCEDURE — 99213 OFFICE O/P EST LOW 20 MIN: CPT | Mod: 25

## 2025-06-03 PROCEDURE — 99385 PREV VISIT NEW AGE 18-39: CPT

## 2025-06-06 LAB
C TRACH RRNA SPEC QL NAA+PROBE: NOT DETECTED
CYTOLOGY CVX/VAG DOC THIN PREP: NORMAL
DHEA-S SERPL-MCNC: 32.5 UG/DL
ESTIMATED AVERAGE GLUCOSE: 126 MG/DL
ESTRADIOL SERPL-MCNC: 101 PG/ML
FSH SERPL-MCNC: 3.9 IU/L
HBA1C MFR BLD HPLC: 6 %
HBV SURFACE AG SER QL: NONREACTIVE
HCG SERPL-MCNC: <1 MIU/ML
HCV AB SER QL: NONREACTIVE
HCV S/CO RATIO: 0.16 S/CO
LH SERPL-ACNC: 5.6 IU/L
N GONORRHOEA RRNA SPEC QL NAA+PROBE: NOT DETECTED
PROLACTIN SERPL-MCNC: 8.5 NG/ML
SOURCE TP AMPLIFICATION: NORMAL
T PALLIDUM AB SER QL IA: NEGATIVE
T4 FREE SERPL-MCNC: 1.2 NG/DL
TESTOST FREE SERPL-MCNC: 0.2 PG/ML
TESTOST SERPL-MCNC: 4.2 NG/DL
TSH SERPL-ACNC: 3.26 UIU/ML

## 2025-06-07 LAB — 17OHP SERPL-MCNC: 14 NG/DL

## 2025-06-24 ENCOUNTER — APPOINTMENT (OUTPATIENT)
Dept: OBGYN | Facility: CLINIC | Age: 30
End: 2025-06-24

## 2025-06-30 ENCOUNTER — INPATIENT (INPATIENT)
Facility: HOSPITAL | Age: 30
LOS: 1 days | Discharge: HOME | End: 2025-07-02
Attending: STUDENT IN AN ORGANIZED HEALTH CARE EDUCATION/TRAINING PROGRAM | Admitting: STUDENT IN AN ORGANIZED HEALTH CARE EDUCATION/TRAINING PROGRAM
Payer: COMMERCIAL

## 2025-06-30 VITALS
DIASTOLIC BLOOD PRESSURE: 59 MMHG | SYSTOLIC BLOOD PRESSURE: 106 MMHG | WEIGHT: 220.02 LBS | TEMPERATURE: 98 F | HEART RATE: 96 BPM | HEIGHT: 67 IN | OXYGEN SATURATION: 100 % | RESPIRATION RATE: 21 BRPM

## 2025-06-30 LAB
ALBUMIN SERPL ELPH-MCNC: 4 G/DL — SIGNIFICANT CHANGE UP (ref 3.3–5)
ALP SERPL-CCNC: 55 U/L — SIGNIFICANT CHANGE UP (ref 40–120)
ALT FLD-CCNC: 15 U/L — SIGNIFICANT CHANGE UP (ref 4–33)
ANION GAP SERPL CALC-SCNC: 14 MMOL/L — SIGNIFICANT CHANGE UP (ref 7–14)
AST SERPL-CCNC: 19 U/L — SIGNIFICANT CHANGE UP (ref 4–32)
BASOPHILS # BLD AUTO: 0.05 K/UL — SIGNIFICANT CHANGE UP (ref 0–0.2)
BASOPHILS NFR BLD AUTO: 0.4 % — SIGNIFICANT CHANGE UP (ref 0–2)
BILIRUB SERPL-MCNC: 0.2 MG/DL — SIGNIFICANT CHANGE UP (ref 0.2–1.2)
BUN SERPL-MCNC: 7 MG/DL — SIGNIFICANT CHANGE UP (ref 7–23)
CALCIUM SERPL-MCNC: 9.3 MG/DL — SIGNIFICANT CHANGE UP (ref 8.4–10.5)
CHLORIDE SERPL-SCNC: 102 MMOL/L — SIGNIFICANT CHANGE UP (ref 98–107)
CO2 SERPL-SCNC: 22 MMOL/L — SIGNIFICANT CHANGE UP (ref 22–31)
CREAT SERPL-MCNC: 0.72 MG/DL — SIGNIFICANT CHANGE UP (ref 0.5–1.3)
EGFR: 117 ML/MIN/1.73M2 — SIGNIFICANT CHANGE UP
EGFR: 117 ML/MIN/1.73M2 — SIGNIFICANT CHANGE UP
EOSINOPHIL # BLD AUTO: 0.01 K/UL — SIGNIFICANT CHANGE UP (ref 0–0.5)
EOSINOPHIL NFR BLD AUTO: 0.1 % — SIGNIFICANT CHANGE UP (ref 0–6)
GLUCOSE SERPL-MCNC: 106 MG/DL — HIGH (ref 70–99)
HCG SERPL-ACNC: <1 MIU/ML — SIGNIFICANT CHANGE UP
HCT VFR BLD CALC: 39.9 % — SIGNIFICANT CHANGE UP (ref 34.5–45)
HGB BLD-MCNC: 13 G/DL — SIGNIFICANT CHANGE UP (ref 11.5–15.5)
IMM GRANULOCYTES # BLD AUTO: 0.06 K/UL — SIGNIFICANT CHANGE UP (ref 0–0.07)
IMM GRANULOCYTES NFR BLD AUTO: 0.5 % — SIGNIFICANT CHANGE UP (ref 0–0.9)
LIDOCAIN IGE QN: 14 U/L — SIGNIFICANT CHANGE UP (ref 7–60)
LYMPHOCYTES # BLD AUTO: 1.29 K/UL — SIGNIFICANT CHANGE UP (ref 1–3.3)
LYMPHOCYTES NFR BLD AUTO: 10.5 % — LOW (ref 13–44)
MCHC RBC-ENTMCNC: 27.1 PG — SIGNIFICANT CHANGE UP (ref 27–34)
MCHC RBC-ENTMCNC: 32.6 G/DL — SIGNIFICANT CHANGE UP (ref 32–36)
MCV RBC AUTO: 83.3 FL — SIGNIFICANT CHANGE UP (ref 80–100)
MONOCYTES # BLD AUTO: 0.72 K/UL — SIGNIFICANT CHANGE UP (ref 0–0.9)
MONOCYTES NFR BLD AUTO: 5.9 % — SIGNIFICANT CHANGE UP (ref 2–14)
NEUTROPHILS # BLD AUTO: 10.15 K/UL — HIGH (ref 1.8–7.4)
NEUTROPHILS NFR BLD AUTO: 82.6 % — HIGH (ref 43–77)
NRBC # BLD AUTO: 0 K/UL — SIGNIFICANT CHANGE UP (ref 0–0)
NRBC # FLD: 0 K/UL — SIGNIFICANT CHANGE UP (ref 0–0)
NRBC BLD AUTO-RTO: 0 /100 WBCS — SIGNIFICANT CHANGE UP (ref 0–0)
PLATELET # BLD AUTO: 341 K/UL — SIGNIFICANT CHANGE UP (ref 150–400)
PMV BLD: 10.2 FL — SIGNIFICANT CHANGE UP (ref 7–13)
POTASSIUM SERPL-MCNC: 3.8 MMOL/L — SIGNIFICANT CHANGE UP (ref 3.5–5.3)
POTASSIUM SERPL-SCNC: 3.8 MMOL/L — SIGNIFICANT CHANGE UP (ref 3.5–5.3)
PROT SERPL-MCNC: 7.3 G/DL — SIGNIFICANT CHANGE UP (ref 6–8.3)
RBC # BLD: 4.79 M/UL — SIGNIFICANT CHANGE UP (ref 3.8–5.2)
RBC # FLD: 14.6 % — HIGH (ref 10.3–14.5)
SODIUM SERPL-SCNC: 138 MMOL/L — SIGNIFICANT CHANGE UP (ref 135–145)
WBC # BLD: 12.28 K/UL — HIGH (ref 3.8–10.5)
WBC # FLD AUTO: 12.28 K/UL — HIGH (ref 3.8–10.5)

## 2025-06-30 PROCEDURE — 99285 EMERGENCY DEPT VISIT HI MDM: CPT

## 2025-06-30 RX ORDER — ACETAMINOPHEN 500 MG/5ML
1000 LIQUID (ML) ORAL ONCE
Refills: 0 | Status: COMPLETED | OUTPATIENT
Start: 2025-06-30 | End: 2025-06-30

## 2025-06-30 RX ORDER — KETOROLAC TROMETHAMINE 30 MG/ML
30 INJECTION, SOLUTION INTRAMUSCULAR; INTRAVENOUS ONCE
Refills: 0 | Status: DISCONTINUED | OUTPATIENT
Start: 2025-06-30 | End: 2025-06-30

## 2025-06-30 RX ORDER — ONDANSETRON HCL/PF 4 MG/2 ML
4 VIAL (ML) INJECTION ONCE
Refills: 0 | Status: COMPLETED | OUTPATIENT
Start: 2025-06-30 | End: 2025-06-30

## 2025-06-30 RX ADMIN — Medication 400 MILLIGRAM(S): at 19:34

## 2025-06-30 RX ADMIN — Medication 4 MILLIGRAM(S): at 19:32

## 2025-06-30 RX ADMIN — Medication 1000 MILLILITER(S): at 19:32

## 2025-06-30 NOTE — ED ADULT NURSE NOTE - OBJECTIVE STATEMENT
Pt is a 30 y/o Female, A&Ox4, ambulatory with no reported PHx, presenting to ED with c/o diffuse abdominal pain, nausea and 5 episodes of vomiting since this morning. Reports pain worse in upper abdomen. Pt endorses smoking marijuana daily, last smoked yesterday. Respirations even and unlabored, chest rise equal b/l. Pt denies chest pain, SOB, fever, cough, chills, bloody/bilious vomit, diarrhea, constipation, h/a, dizziness, numbness/tingling or any urinary symptoms at this time. No acute distress noted. 20g IV placed in left hand. Labs collected and sent. Medications administered as ordered, see MAR. Safety maintained throughout.

## 2025-06-30 NOTE — ED PROVIDER NOTE - CLINICAL SUMMARY MEDICAL DECISION MAKING FREE TEXT BOX
Robinson BRUNO: Patient is a 29-year-old female s/p recent rotator cuff surgery last week, now here for abdominal pain, nausea and vomiting.  Patient reports that she started to have vomiting today.  She reports diffuse abdominal pain, worse in the upper abdomen.  Patient reports 5 episodes of nonbilious, nonbloody vomiting.  She states that she had a very small bowel movement that was firm and small.  Over the past week she has been taking Percocet twice a day for pain control in addition to aspirin.  Patient states that she had 1 small bowel movement during the week.  She denies any vaginal symptoms.  No vaginal bleeding.  No urinary symptoms.  Patient denies any abdominal surgical history.  on exam, patient appears to be uncomfortable.  She has mild diffuse abdominal tenderness.  There is no rebound but there is voluntary guarding.  Patient does endorse that she smokes marijuana.  She denies any history of cannabinoid hyperemesis.   Plan for labs, CT abdomen pelvis.  Will give IV fluids, IV Tylenol and Zofran and reassess.  Given diffuse nature of abdominal pain, concern for constipation however will rule out obstruction.  Patient is also tender in her right lower quadrant.  Although lower suspicion for appendicitis, will evaluate with CT scan.

## 2025-06-30 NOTE — ED PROVIDER NOTE - OBJECTIVE STATEMENT
Robinson BRUNO: Patient is a 29-year-old female s/p recent rotator cuff surgery last week, now here for abdominal pain, nausea and vomiting.  Patient reports that she started to have vomiting today.  She reports diffuse abdominal pain, worse in the upper abdomen.  Patient reports 5 episodes of nonbilious, nonbloody vomiting.  She states that she had a very small bowel movement that was firm and small.  Over the past week she has been taking Percocet twice a day for pain control in addition to aspirin.  Patient states that she had 1 small bowel movement during the week.  She denies any vaginal symptoms.  No vaginal bleeding.  No urinary symptoms.  Patient denies any abdominal surgical history.

## 2025-06-30 NOTE — ED PROVIDER NOTE - PHYSICAL EXAMINATION
VS noted  Gen.  uncomfortable  HEENT: EOMI, mmm  Lungs: CTAB/L no C/ W /R   CVS: RRR   Abd; Soft, mild diffuse tenderness, voluntary guarding  Ext: no edema  Skin: no rash  Neuro AAOx3 non focal clear speech  - Robinson BRUNO VS noted  Gen.  uncomfortable  HEENT: EOMI, mmm  Lungs: CTAB/L no C/ W /R   CVS: RRR   Abd; Soft, mild diffuse tenderness, RLQ tenderness, voluntary guarding  Ext: no edema  Skin: no rash  Neuro AAOx3 non focal clear speech  - Robinson BRUNO

## 2025-06-30 NOTE — ED ADULT NURSE NOTE - NSFALLRISKASMTTYPE_ED_ALL_ED
Initial (On Arrival)
PAST MEDICAL HISTORY:  Asthma     Bipolar Disorder     Borderline Personality Disorder     Cholelithiasis     Depression     Fibroids     GERD (Gastroesophageal Reflux Disease)     Obesity

## 2025-07-01 ENCOUNTER — TRANSCRIPTION ENCOUNTER (OUTPATIENT)
Age: 30
End: 2025-07-01

## 2025-07-01 DIAGNOSIS — K35.80 UNSPECIFIED ACUTE APPENDICITIS: ICD-10-CM

## 2025-07-01 LAB
ANION GAP SERPL CALC-SCNC: 17 MMOL/L — HIGH (ref 7–14)
APPEARANCE UR: CLEAR — SIGNIFICANT CHANGE UP
APTT BLD: 30.8 SEC — SIGNIFICANT CHANGE UP (ref 26.1–36.8)
BACTERIA # UR AUTO: ABNORMAL /HPF
BILIRUB UR-MCNC: NEGATIVE — SIGNIFICANT CHANGE UP
BLD GP AB SCN SERPL QL: NEGATIVE — SIGNIFICANT CHANGE UP
BUN SERPL-MCNC: 6 MG/DL — LOW (ref 7–23)
CALCIUM SERPL-MCNC: 8.4 MG/DL — SIGNIFICANT CHANGE UP (ref 8.4–10.5)
CAST: 0 /LPF — SIGNIFICANT CHANGE UP (ref 0–4)
CHLORIDE SERPL-SCNC: 101 MMOL/L — SIGNIFICANT CHANGE UP (ref 98–107)
CO2 SERPL-SCNC: 18 MMOL/L — LOW (ref 22–31)
COLOR SPEC: YELLOW — SIGNIFICANT CHANGE UP
CREAT SERPL-MCNC: 0.68 MG/DL — SIGNIFICANT CHANGE UP (ref 0.5–1.3)
DIFF PNL FLD: NEGATIVE — SIGNIFICANT CHANGE UP
EGFR: 122 ML/MIN/1.73M2 — SIGNIFICANT CHANGE UP
EGFR: 122 ML/MIN/1.73M2 — SIGNIFICANT CHANGE UP
GLUCOSE SERPL-MCNC: 77 MG/DL — SIGNIFICANT CHANGE UP (ref 70–99)
GLUCOSE UR QL: NEGATIVE MG/DL — SIGNIFICANT CHANGE UP
INR BLD: 1.2 RATIO — HIGH (ref 0.85–1.16)
KETONES UR QL: 80 MG/DL
LEUKOCYTE ESTERASE UR-ACNC: NEGATIVE — SIGNIFICANT CHANGE UP
NITRITE UR-MCNC: NEGATIVE — SIGNIFICANT CHANGE UP
PH UR: 8 — SIGNIFICANT CHANGE UP (ref 5–8)
POTASSIUM SERPL-MCNC: 3.9 MMOL/L — SIGNIFICANT CHANGE UP (ref 3.5–5.3)
POTASSIUM SERPL-SCNC: 3.9 MMOL/L — SIGNIFICANT CHANGE UP (ref 3.5–5.3)
PROT UR-MCNC: 30 MG/DL
PROTHROM AB SERPL-ACNC: 14.2 SEC — HIGH (ref 9.9–13.4)
RBC CASTS # UR COMP ASSIST: 1 /HPF — SIGNIFICANT CHANGE UP (ref 0–4)
RH IG SCN BLD-IMP: POSITIVE — SIGNIFICANT CHANGE UP
SODIUM SERPL-SCNC: 136 MMOL/L — SIGNIFICANT CHANGE UP (ref 135–145)
SP GR SPEC: 1.03 — HIGH (ref 1–1.03)
SQUAMOUS # UR AUTO: 3 /HPF — SIGNIFICANT CHANGE UP (ref 0–5)
UROBILINOGEN FLD QL: 1 MG/DL — SIGNIFICANT CHANGE UP (ref 0.2–1)
WBC UR QL: 0 /HPF — SIGNIFICANT CHANGE UP (ref 0–5)

## 2025-07-01 PROCEDURE — 88304 TISSUE EXAM BY PATHOLOGIST: CPT | Mod: 26

## 2025-07-01 PROCEDURE — 74177 CT ABD & PELVIS W/CONTRAST: CPT | Mod: 26

## 2025-07-01 PROCEDURE — 99222 1ST HOSP IP/OBS MODERATE 55: CPT | Mod: 57,GC

## 2025-07-01 DEVICE — STAPLER COVIDIEN TRI-STAPLE 45MM TAN RELOAD: Type: IMPLANTABLE DEVICE | Status: FUNCTIONAL

## 2025-07-01 RX ORDER — OXYCODONE HYDROCHLORIDE 30 MG/1
5 TABLET ORAL EVERY 6 HOURS
Refills: 0 | Status: DISCONTINUED | OUTPATIENT
Start: 2025-07-01 | End: 2025-07-02

## 2025-07-01 RX ORDER — ACETAMINOPHEN 500 MG/5ML
1000 LIQUID (ML) ORAL ONCE
Refills: 0 | Status: COMPLETED | OUTPATIENT
Start: 2025-07-01 | End: 2025-07-01

## 2025-07-01 RX ORDER — OXYCODONE HYDROCHLORIDE 30 MG/1
2.5 TABLET ORAL EVERY 6 HOURS
Refills: 0 | Status: DISCONTINUED | OUTPATIENT
Start: 2025-07-01 | End: 2025-07-02

## 2025-07-01 RX ORDER — ONDANSETRON HCL/PF 4 MG/2 ML
4 VIAL (ML) INJECTION EVERY 6 HOURS
Refills: 0 | Status: DISCONTINUED | OUTPATIENT
Start: 2025-07-01 | End: 2025-07-02

## 2025-07-01 RX ORDER — PIPERACILLIN-TAZO-DEXTROSE,ISO 3.375G/5
3.38 IV SOLUTION, PIGGYBACK PREMIX FROZEN(ML) INTRAVENOUS ONCE
Refills: 0 | Status: COMPLETED | OUTPATIENT
Start: 2025-07-01 | End: 2025-07-01

## 2025-07-01 RX ORDER — ONDANSETRON HCL/PF 4 MG/2 ML
4 VIAL (ML) INJECTION ONCE
Refills: 0 | Status: COMPLETED | OUTPATIENT
Start: 2025-07-01 | End: 2025-07-01

## 2025-07-01 RX ORDER — ACETAMINOPHEN 500 MG/5ML
1000 LIQUID (ML) ORAL EVERY 6 HOURS
Refills: 0 | Status: DISCONTINUED | OUTPATIENT
Start: 2025-07-01 | End: 2025-07-01

## 2025-07-01 RX ORDER — HEPARIN SODIUM 1000 [USP'U]/ML
5000 INJECTION INTRAVENOUS; SUBCUTANEOUS EVERY 8 HOURS
Refills: 0 | Status: DISCONTINUED | OUTPATIENT
Start: 2025-07-01 | End: 2025-07-02

## 2025-07-01 RX ORDER — OXYCODONE HYDROCHLORIDE 30 MG/1
5 TABLET ORAL ONCE
Refills: 0 | Status: DISCONTINUED | OUTPATIENT
Start: 2025-07-01 | End: 2025-07-01

## 2025-07-01 RX ORDER — FENTANYL CITRATE-0.9 % NACL/PF 100MCG/2ML
50 SYRINGE (ML) INTRAVENOUS
Refills: 0 | Status: DISCONTINUED | OUTPATIENT
Start: 2025-07-01 | End: 2025-07-01

## 2025-07-01 RX ORDER — OXYCODONE HYDROCHLORIDE 30 MG/1
1 TABLET ORAL
Qty: 5 | Refills: 0
Start: 2025-07-01

## 2025-07-01 RX ORDER — ACETAMINOPHEN 500 MG/5ML
2 LIQUID (ML) ORAL
Qty: 0 | Refills: 0 | DISCHARGE
Start: 2025-07-01

## 2025-07-01 RX ORDER — HYDROMORPHONE/SOD CHLOR,ISO/PF 2 MG/10 ML
0.2 SYRINGE (ML) INJECTION ONCE
Refills: 0 | Status: DISCONTINUED | OUTPATIENT
Start: 2025-07-01 | End: 2025-07-01

## 2025-07-01 RX ORDER — HYDROMORPHONE/SOD CHLOR,ISO/PF 2 MG/10 ML
0.5 SYRINGE (ML) INJECTION
Refills: 0 | Status: DISCONTINUED | OUTPATIENT
Start: 2025-07-01 | End: 2025-07-02

## 2025-07-01 RX ORDER — ACETAMINOPHEN 500 MG/5ML
1000 LIQUID (ML) ORAL EVERY 6 HOURS
Refills: 0 | Status: DISCONTINUED | OUTPATIENT
Start: 2025-07-01 | End: 2025-07-02

## 2025-07-01 RX ORDER — FENTANYL CITRATE-0.9 % NACL/PF 100MCG/2ML
25 SYRINGE (ML) INTRAVENOUS
Refills: 0 | Status: DISCONTINUED | OUTPATIENT
Start: 2025-07-01 | End: 2025-07-01

## 2025-07-01 RX ORDER — SODIUM CHLORIDE 9 G/1000ML
1000 INJECTION, SOLUTION INTRAVENOUS
Refills: 0 | Status: DISCONTINUED | OUTPATIENT
Start: 2025-07-01 | End: 2025-07-01

## 2025-07-01 RX ADMIN — Medication 1000 MILLIGRAM(S): at 12:13

## 2025-07-01 RX ADMIN — OXYCODONE HYDROCHLORIDE 5 MILLIGRAM(S): 30 TABLET ORAL at 17:45

## 2025-07-01 RX ADMIN — Medication 1000 MILLIGRAM(S): at 11:35

## 2025-07-01 RX ADMIN — HEPARIN SODIUM 5000 UNIT(S): 1000 INJECTION INTRAVENOUS; SUBCUTANEOUS at 22:03

## 2025-07-01 RX ADMIN — Medication 4 MILLIGRAM(S): at 16:05

## 2025-07-01 RX ADMIN — Medication 1000 MILLIGRAM(S): at 23:18

## 2025-07-01 RX ADMIN — OXYCODONE HYDROCHLORIDE 5 MILLIGRAM(S): 30 TABLET ORAL at 16:45

## 2025-07-01 RX ADMIN — Medication 125 MILLILITER(S): at 17:45

## 2025-07-01 RX ADMIN — Medication 400 MILLIGRAM(S): at 05:11

## 2025-07-01 RX ADMIN — OXYCODONE HYDROCHLORIDE 5 MILLIGRAM(S): 30 TABLET ORAL at 17:15

## 2025-07-01 RX ADMIN — KETOROLAC TROMETHAMINE 30 MILLIGRAM(S): 30 INJECTION, SOLUTION INTRAMUSCULAR; INTRAVENOUS at 00:14

## 2025-07-01 RX ADMIN — OXYCODONE HYDROCHLORIDE 5 MILLIGRAM(S): 30 TABLET ORAL at 23:56

## 2025-07-01 RX ADMIN — Medication 1000 MILLIGRAM(S): at 17:05

## 2025-07-01 RX ADMIN — Medication 0.2 MILLIGRAM(S): at 10:06

## 2025-07-01 RX ADMIN — Medication 125 MILLILITER(S): at 07:25

## 2025-07-01 RX ADMIN — Medication 0.5 MILLIGRAM(S): at 16:25

## 2025-07-01 RX ADMIN — Medication 0.2 MILLIGRAM(S): at 11:35

## 2025-07-01 RX ADMIN — Medication 200 GRAM(S): at 06:54

## 2025-07-01 RX ADMIN — HEPARIN SODIUM 5000 UNIT(S): 1000 INJECTION INTRAVENOUS; SUBCUTANEOUS at 07:59

## 2025-07-01 RX ADMIN — Medication 25 GRAM(S): at 11:35

## 2025-07-01 RX ADMIN — OXYCODONE HYDROCHLORIDE 5 MILLIGRAM(S): 30 TABLET ORAL at 18:45

## 2025-07-01 RX ADMIN — Medication 0.5 MILLIGRAM(S): at 16:45

## 2025-07-01 NOTE — DISCHARGE NOTE NURSING/CASE MANAGEMENT/SOCIAL WORK - FINANCIAL ASSISTANCE
[facility] provides services at a reduced cost to those who are determined to be eligible through [facility]’s financial assistance program. Information regarding [facility]’s financial assistance program can be found by going to [email or web address] or by calling [phone].

## 2025-07-01 NOTE — H&P ADULT - HISTORY OF PRESENT ILLNESS
29F with no significant past medical history (but recent shoulder surgery), presents with abdominal pain, n/v starting yesterday and worsening overnight. She states that the pain felt dull and generalized at first but became much sharper and more severe in the RLQ late last night. at that point she was unable to tolerate any PO at all and decided to come to the ED. She denies fevers/chills, hematochezia, melena, bloody vomit, CP, SOB.     In the ED WBC elevated to 13, CT scan showing early tip appendicitis with an appendicolith.

## 2025-07-01 NOTE — H&P ADULT - ATTENDING COMMENTS
I have reviewed the history, pertinent labs and imaging, and discussed the care with the consult resident.  More than 50% of this 55 minute encounter including face to face with the patient was spent counseling and/or coordination of care on acute appendicitis.  Time included review of vitals, labs, imaging, discussion with consultants and coordination with the operating room/emergency department.    28yo F presenting with RLQ pain. CT consistent with acute appendicitis, nonperforated, with appendicolith. Pt agreeable to proceed with appendectomy.     - NPO, IVF, abx   - appendectomy pending OR availability     The active issues are:  1. acute appendicitis    The Acute Care Surgery (B Team) Attending Group Practice:  Dr. Aneta Hdez    p [phone]  patient appointments or afterhours - [phone]

## 2025-07-01 NOTE — BRIEF OPERATIVE NOTE - OPERATION/FINDINGS
-Alok entry  -Long, distended, inflamed appendix   -Endo ALICIA 45mm Tan   -Meso appendix w Ligasure

## 2025-07-01 NOTE — DISCHARGE NOTE PROVIDER - CARE PROVIDER_API CALL
Anthony Becerra  Surgical Critical Care  [address] [address] [place] [id number]  Phone: [phone]  Fax: [phone]  Follow Up Time: 2 weeks

## 2025-07-01 NOTE — H&P ADULT - NSHPPHYSICALEXAM_GEN_ALL_CORE
Physical Examination:  General: NAD, resting comfortably in bed  HEENT: Normocephalic atraumatic  Respiratory: Nonlabored respirations, normal CW expansion.  Cardio: regular rate, normotensive  Abdomen: soft, non-distended, TTP in RLQ, (+) rovsing's sign   Skin: no rashes/lesions  MSK: no deformities  Extremities: moves all 4  Neuro: non-focal, alert oriented  Psych: appropriate affect

## 2025-07-01 NOTE — DISCHARGE NOTE PROVIDER - NSDCMRMEDTOKEN_GEN_ALL_CORE_FT
acetaminophen 500 mg oral tablet: 2 tab(s) orally every 6 hours  oxyCODONE 5 mg oral tablet: 1 tab(s) orally every 6 hours as needed for Severe Pain (7 - 10) MDD: 4  tabs

## 2025-07-01 NOTE — DISCHARGE NOTE NURSING/CASE MANAGEMENT/SOCIAL WORK - NSDCPECAREGIVERED_GEN_ALL_CORE
Medline and carenotes for surgical procedure AP, incision care, pain mgt, as well as DC Medications and side effects literature for patient reference.

## 2025-07-01 NOTE — DISCHARGE NOTE PROVIDER - NSDCFUSCHEDAPPT_GEN_ALL_CORE_FT
[facility]  OBGYNGEN [address]  Scheduled Appointment: 07/15/2025    [facility]  OBGYNGEN [address]  Scheduled Appointment: 08/12/2025    [facility]  OBGYNGEN [address]  Scheduled Appointment: 08/12/2025    [facility]  ENDOCRIN [address]  Scheduled Appointment: 08/26/2025

## 2025-07-01 NOTE — DISCHARGE NOTE PROVIDER - NSDCFUADDINST_GEN_ALL_CORE_FT
PAIN CONTROL: Please take tylenol and motrin for pain control. You may take each every 6 hours, alternating the two every 3 hours. For example, Tylenol at 9am, Motrin at 12pm, Tylenol at 3pm, etc.     You have been prescribed Oxycodone for severe pain not managed with over the counter tylenol/motrin. Please take every 6 hours as needed. You may take even if you have taken Tylenol or Motrin.     WOUND CARE:  Please keep incisions clean and dry. Please do not Scrub or rub incisions. Do not use lotion or powder on incisions. You have steri strip over your incision. Please do not remove these, they will fall off on their own. You have Clear Bandaid dressing over your steri-strips. Please remove in 48 hours.     BATHING: You may shower and/or sponge bathe starting tomorrow. Please do not submerge wound underwater. You may use warm soapy water in the shower and rinse, pat dry.    ACTIVITY: No heavy lifting or straining. Otherwise, you may return to your usual level of physical activity. If you are taking narcotic pain medication DO NOT drive a car, operate machinery or make important decisions.    DIET: Return to your usual diet.    NOTIFY YOUR SURGEON IF YOU HAVE: any bleeding that does not stop, any pus draining from your wound(s), any fever (over 100.4 F) persistent nausea/vomiting, or if your pain is not controlled on your discharge pain medications, unable to urinate.    Please follow-up with your surgeon, within 1-2 weeks following discharge- please call to schedule an appointment.

## 2025-07-01 NOTE — DISCHARGE NOTE NURSING/CASE MANAGEMENT/SOCIAL WORK - NSDCPEFALRISK_GEN_ALL_CORE
For information on Fall & Injury Prevention, visit: [email or web address] OR  [email or web address] OR  https://www.cdc.gov/steadi/patient.html

## 2025-07-01 NOTE — H&P ADULT - NSHPLABSRESULTS_GEN_ALL_CORE
home
CBC with Auto Diff (Reflex to Man Diff) (06.30.25 @ 19:30)   WBC Count: 12.28 K/uL  RBC Count: 4.79 M/uL  Hemoglobin: 13.0 g/dL  Hematocrit: 39.9 %  Mean Cell Volume: 83.3 fl  Mean Cell Hemoglobin: 27.1 pg  Mean Cell Hemoglobin Conc: 32.6 g/dL  Red Cell Distrib Width: 14.6 %  Platelet Count - Automated: 341 K/uL  MPV: 10.2 fL  Auto NRBC: 0 /100 WBCs  Auto Nucleated RBC #: 0.00 K/uL  Auto Neutrophil %: 82.6: Differential percentages must be correlated with absolute numbers for   clinical significance. %  Auto Lymphocyte %: 10.5 %  Auto Monocyte %: 5.9 %  Auto Eosinophil %: 0.1 %  Auto Basophil %: 0.4 %  Auto Immature Granulocyte %: 0.5: (Includes meta, myelo and promyelocytes). Mild elevations in immature   granulocytes may be seen with many inflammatory processes and pregnancy;   clinical correlation suggested. %  Auto Neutrophil #: 10.15 K/uL  Auto Lymphocyte #: 1.29 K/uL  Auto Monocyte #: 0.72 K/uL  Auto Eosinophil #: 0.01 K/uL  Auto Basophil #: 0.05 K/uL  Auto Immature Granulocyte #: 0.06 K/uLLipase (06.30.25 @ 19:30)   Lipase: 14 U/L      Historical Values  Lipase (06.30.25 @ 19:30)   Lipase: 14 U/L  Lipase (05.24.25 @ 10:55)   Lipase: 18 U/LHCG Quantitative, Serum (06.30.25 @ 19:30)   HCG Quantitative, Serum: <1.0: For pregnancy evaluation the reference values are as follows:   Negative: <5 mIU/mL Urinalysis with Rflx Culture (07.01.25 @ 04:45)   Urine Appearance: Clear  Color: Yellow  Specific Gravity: 1.034  pH Urine: 8.0  Protein, Urine: 30 mg/dL  Glucose Qualitative, Urine: Negative mg/dL  Ketone , Urine: 80 mg/dL  Blood, Urine: Negative  Bilirubin: Negative  Urobilinogen: 1.0 mg/dL  Leukocyte Esterase Concentration: Negative  Nitrite: NegativeUrine Microscopic-Add On (NC) (07.01.25 @ 04:45)   White Blood Cell - Urine: 0 /HPF  Red Blood Cell - Urine: 1 /HPF  Bacteria: Few /HPF  Cast: 0 /LPF  Epithelial Cells: 3 /HPF< from: CT Abdomen and Pelvis w/ IV Cont (07.01.25 @ 02:46) >    IMPRESSION:  A 5 mm appendicolith in the distal appendix with appendix measuring up to   8 mm in diameter distal to this finding. Correlate clinically for early   acute tip appendicitis.    A 4.8 cm right adnexal cyst, new since prior study. Pelvic ultrasound   recommended for further evaluation.    A 1.1 cm linear radiopaque structure in the proximal sigmoid colon, which   may represent an ingested foreign body.    < end of copied text >

## 2025-07-01 NOTE — DISCHARGE NOTE NURSING/CASE MANAGEMENT/SOCIAL WORK - PATIENT PORTAL LINK FT
You can access the FollowMyHealth Patient Portal offered by [facility] by registering at the following website: [email or web address]. By joining [employer]’s FollowMyHealth portal, you will also be able to view your health information using other applications (apps) compatible with our system.

## 2025-07-01 NOTE — DISCHARGE NOTE PROVIDER - HOSPITAL COURSE
Ms Nagy is a 29F w PMHx Obesity, PSHx Rotator Cuff Repair, pw abdominal pain dx acute appendicitis. Patient was started on IV abx and brought to the operating room for laparoscopic appendectomy. She underwent laparoscopic appendectomy, recovered appropriately in the PACU and was transferred to the floor when she met criteria. She was ambulating, pain was well controlled, and tolerating a regular diet. She was deemed stable for safe discharge home. Counseled on strict return precautions.

## 2025-07-01 NOTE — H&P ADULT - ASSESSMENT
A:   29F no PMHx p/w abdominal pain, n/v; CT scan showing appendicolith with dilated appendix distally, WBC of 13. Plan for OR for lap appy.     P:  - Admit to Dr. Hdez  - Added on for lap appy  - consented  - NPO/IVF  - IV Abx  - preop labs    Discussed with Dr. Lemuel ROB team surgery  [id number]

## 2025-07-02 VITALS
RESPIRATION RATE: 17 BRPM | HEART RATE: 70 BPM | OXYGEN SATURATION: 100 % | DIASTOLIC BLOOD PRESSURE: 68 MMHG | SYSTOLIC BLOOD PRESSURE: 121 MMHG | TEMPERATURE: 98 F

## 2025-07-02 RX ORDER — OXYCODONE HYDROCHLORIDE 30 MG/1
2.5 TABLET ORAL ONCE
Refills: 0 | Status: DISCONTINUED | OUTPATIENT
Start: 2025-07-02 | End: 2025-07-02

## 2025-07-02 RX ADMIN — OXYCODONE HYDROCHLORIDE 2.5 MILLIGRAM(S): 30 TABLET ORAL at 05:10

## 2025-07-02 RX ADMIN — Medication 1000 MILLIGRAM(S): at 11:31

## 2025-07-02 RX ADMIN — OXYCODONE HYDROCHLORIDE 5 MILLIGRAM(S): 30 TABLET ORAL at 09:23

## 2025-07-02 RX ADMIN — HEPARIN SODIUM 5000 UNIT(S): 1000 INJECTION INTRAVENOUS; SUBCUTANEOUS at 05:11

## 2025-07-02 RX ADMIN — Medication 1000 MILLIGRAM(S): at 05:10

## 2025-07-02 RX ADMIN — Medication 1000 MILLIGRAM(S): at 00:07

## 2025-07-02 RX ADMIN — Medication 1000 MILLIGRAM(S): at 05:25

## 2025-07-02 RX ADMIN — OXYCODONE HYDROCHLORIDE 2.5 MILLIGRAM(S): 30 TABLET ORAL at 05:25

## 2025-07-02 RX ADMIN — OXYCODONE HYDROCHLORIDE 5 MILLIGRAM(S): 30 TABLET ORAL at 00:07

## 2025-07-02 NOTE — PROGRESS NOTE ADULT - SUBJECTIVE AND OBJECTIVE BOX
TEAM [B] Surgery Daily Progress Note  =====================================================    SUBJECTIVE: Patient seen and examined at bedside on AM rounds. Patient reports pain appropriately controlled. Tolerating diet, denies nausea, vomiting. OOB/Amublating as tolerated. Denies fever, chills.      ALLERGIES:  No Known Allergies      --------------------------------------------------------------------------------------    MEDICATIONS:    Neurologic Medications  acetaminophen     Tablet .. 1000 milliGRAM(s) Oral every 6 hours  HYDROmorphone  Injectable 0.5 milliGRAM(s) IV Push every 10 minutes PRN Severe Pain (7 - 10)  ondansetron Injectable 4 milliGRAM(s) IV Push every 6 hours PRN Nausea  oxyCODONE    IR 2.5 milliGRAM(s) Oral every 6 hours PRN Moderate Pain (4 - 6)  oxyCODONE    IR 5 milliGRAM(s) Oral every 6 hours PRN Severe Pain (7 - 10)    Respiratory Medications    Cardiovascular Medications    Gastrointestinal Medications    Genitourinary Medications    Hematologic/Oncologic Medications  heparin   Injectable 5000 Unit(s) SubCutaneous every 8 hours    Antimicrobial/Immunologic Medications    Endocrine/Metabolic Medications    Topical/Other Medications    --------------------------------------------------------------------------------------    VITAL SIGNS:  T(C): 36.4 (07-02-25 @ 05:10), Max: 37.1 (07-02-25 @ 02:01)  HR: 61 (07-02-25 @ 05:10) (54 - 97)  BP: 121/61 (07-02-25 @ 05:10) (101/55 - 129/84)  RR: 16 (07-02-25 @ 05:10) (14 - 19)  SpO2: 100% (07-02-25 @ 05:10) (96% - 100%)  --------------------------------------------------------------------------------------    EXAM    General: NAD, resting in bed comfortably.  Cardiac: regular rate, warm and well perfused  Respiratory: Nonlabored respirations, normal cw expansion.  Abdomen: soft, TTP at incision sites, nondistended. incisions c/d/i.   Extremities: normal strength, FROM, no deformities    --------------------------------------------------------------------------------------    LABS                        13.0   12.28 )-----------( 341      ( 30 Jun 2025 19:30 )             39.9   07-01    136  |  101  |  6[L]  ----------------------------<  77  3.9   |  18[L]  |  0.68    Ca    8.4      01 Jul 2025 06:52    TPro  7.3  /  Alb  4.0  /  TBili  0.2  /  DBili  x   /  AST  19  /  ALT  15  /  AlkPhos  55  06-30    --------------------------------------------------------------------------------------    INS AND OUTS:    07-01-25 @ 07:01  -  07-02-25 @ 07:00  --------------------------------------------------------  IN: 2060 mL / OUT: 600 mL / NET: 1460 mL      --------------------------------------------------------------------------------------
ANESTHESIA POSTOP CHECK    29y Female POSTOP DAY 1 S/P Lap AP    Vital Signs Last 24 Hrs  T(C): 36.8 (02 Jul 2025 10:00), Max: 37.1 (02 Jul 2025 02:01)  T(F): 98.3 (02 Jul 2025 10:00), Max: 98.8 (02 Jul 2025 02:01)  HR: 70 (02 Jul 2025 10:00) (54 - 97)  BP: 121/68 (02 Jul 2025 10:00) (101/55 - 126/60)  BP(mean): 68 (01 Jul 2025 16:30) (68 - 81)  RR: 17 (02 Jul 2025 10:00) (14 - 19)  SpO2: 100% (02 Jul 2025 10:00) (96% - 100%)    Parameters below as of 02 Jul 2025 10:00  Patient On (Oxygen Delivery Method): room air      I&O's Summary    01 Jul 2025 07:01  -  02 Jul 2025 07:00  --------------------------------------------------------  IN: 2060 mL / OUT: 600 mL / NET: 1460 mL        [x ] NO APPARENT ANESTHESIA COMPLICATIONS      Comments:

## 2025-07-02 NOTE — PROGRESS NOTE ADULT - ASSESSMENT
29y Female with no significant past medical history (but recent shoulder surgery), presented with abdominal pain, n/v, now s/p laparoscopic appendectomy 7/1.    -DVT prophylaxis w/ sqh.    -Encouraged OOB  -Diet: Regular  -Discharge today 29y Female with no significant past medical history (but recent shoulder surgery), p/w acute appendicitis, now s/p laparoscopic appendectomy 7/1.    -DVT prophylaxis w/ sqh.    -Encouraged OOB  -Diet: Regular  -Discharge home today    B Team Surgery [id number]

## 2025-07-02 NOTE — CHART NOTE - NSCHARTNOTEFT_GEN_A_CORE
General Surgery Post op Check    Pt seen and examined at bedside. Pt states she is uncomfortable and does not wish to go home overnight. She states that she is having cramping pain similar to period cramps in her lower abdomen. She endorses urinating and passing flatus. She has not had a BM yet, but she states she feels like she has to have one, however the pain is preventing her from having one. Denies SOB/CP/N/V.     Vital Signs Last 24 Hrs  T(C): 37.1 (02 Jul 2025 02:01), Max: 37.1 (02 Jul 2025 02:01)  T(F): 98.8 (02 Jul 2025 02:01), Max: 98.8 (02 Jul 2025 02:01)  HR: 54 (02 Jul 2025 02:01) (54 - 97)  BP: 107/55 (02 Jul 2025 02:01) (101/55 - 129/84)  BP(mean): 68 (01 Jul 2025 16:30) (68 - 81)  RR: 18 (02 Jul 2025 02:01) (14 - 19)  SpO2: 98% (02 Jul 2025 02:01) (96% - 100%)    Parameters below as of 02 Jul 2025 02:01  Patient On (Oxygen Delivery Method): room air        I&O's Summary    01 Jul 2025 07:01  -  02 Jul 2025 04:44  --------------------------------------------------------  IN: 1385 mL / OUT: 600 mL / NET: 785 mL        Physical Exam  Gen: NAD, A&Ox3  Pulm: No respiratory distress, no subcostal retractions  CV: RRR, no JVD  Abd: Soft, tender to palpation in RLQ, mildly distended, incisions c/d/i  UOP: Pt is producing urine. No osorio.       A/P: 29y Female s/p laparoscopic appendectomy. Patient endorses pain in her RLQ, but denies nausea, vomits, fever, chills, or SOB. Abdominal exam with appropriate incisional tenderness without significant distention, or bleeding/oozing from incison sites. Hemglobin stable, Lactate and creatinie WNL. Patient hemodynamically stable recovering well post-procedure.     -DVT prophylaxis w/ SCD.    -Encouraged OOB  -Diet: Regular  -Analgesia and antiemetics as needed

## 2025-07-15 ENCOUNTER — APPOINTMENT (OUTPATIENT)
Dept: OBGYN | Facility: CLINIC | Age: 30
End: 2025-07-15

## 2025-07-15 LAB — SURGICAL PATHOLOGY STUDY: SIGNIFICANT CHANGE UP

## 2025-07-18 ENCOUNTER — APPOINTMENT (OUTPATIENT)
Dept: TRAUMA SURGERY | Facility: HOSPITAL | Age: 30
End: 2025-07-18

## 2025-07-18 VITALS
HEART RATE: 68 BPM | WEIGHT: 225 LBS | BODY MASS INDEX: 35.31 KG/M2 | SYSTOLIC BLOOD PRESSURE: 133 MMHG | HEIGHT: 67 IN | DIASTOLIC BLOOD PRESSURE: 78 MMHG | TEMPERATURE: 98.1 F

## 2025-08-12 ENCOUNTER — APPOINTMENT (OUTPATIENT)
Dept: OBGYN | Facility: CLINIC | Age: 30
End: 2025-08-12

## 2025-08-26 ENCOUNTER — APPOINTMENT (OUTPATIENT)
Dept: ENDOCRINOLOGY | Facility: CLINIC | Age: 30
End: 2025-08-26

## (undated) DEVICE — TROCAR COVIDIEN VERSAPORT BLADELESS OPTICAL 5MM STANDARD

## (undated) DEVICE — SOL INJ NS 0.9% 1000ML

## (undated) DEVICE — BASIN SET SINGLE

## (undated) DEVICE — VENODYNE/SCD SLEEVE CALF MEDIUM

## (undated) DEVICE — ENDOCATCH 10MM

## (undated) DEVICE — DRAPE WARMING SOLUTION 44 X 44"

## (undated) DEVICE — TUBING INSUFFLATION LAP FILTER 10FT

## (undated) DEVICE — ANESTHESIA CIRCUIT ADULT HMEF

## (undated) DEVICE — SUT VICRYL 0 27" UR-6

## (undated) DEVICE — TUBING STRYKEFLOW II SUCTION / IRRIGATOR

## (undated) DEVICE — ELCTR BOVIE PENCIL SMOKE EVACUATION

## (undated) DEVICE — SHEARS COVIDIEN ENDO SHEAR 5MM X 31CM W UNIPOLAR CAUTERY

## (undated) DEVICE — TROCAR COVIDIEN BLUNT TIP HASSAN 12MM

## (undated) DEVICE — TROCAR COVIDIEN VERSAONE FIXATION CANNULA 5MM

## (undated) DEVICE — STAPLER COVIDIEN ENDO GIA STANDARD HANDLE

## (undated) DEVICE — PACK GENERAL LAPAROSCOPY

## (undated) DEVICE — BLADE SURGICAL #15 CARBON

## (undated) DEVICE — ELCTR GROUNDING PAD ADULT COVIDIEN

## (undated) DEVICE — SUT MONOCRYL 4-0 27" PS-2 UNDYED

## (undated) DEVICE — GLV 7.5 PROTEXIS (WHITE)

## (undated) DEVICE — TUBING OLYMPUS INSUFFLATION